# Patient Record
Sex: MALE | Race: WHITE | Employment: OTHER
[De-identification: names, ages, dates, MRNs, and addresses within clinical notes are randomized per-mention and may not be internally consistent; named-entity substitution may affect disease eponyms.]

---

## 2017-02-17 ENCOUNTER — OFFICE VISIT (OUTPATIENT)
Dept: FAMILY MEDICINE CLINIC | Facility: CLINIC | Age: 67
End: 2017-02-17

## 2017-02-17 VITALS
DIASTOLIC BLOOD PRESSURE: 76 MMHG | HEART RATE: 62 BPM | TEMPERATURE: 97.7 F | SYSTOLIC BLOOD PRESSURE: 124 MMHG | OXYGEN SATURATION: 96 %

## 2017-02-17 DIAGNOSIS — J01.90 ACUTE SINUSITIS, RECURRENCE NOT SPECIFIED, UNSPECIFIED LOCATION: Primary | ICD-10-CM

## 2017-02-17 PROCEDURE — 99212 OFFICE O/P EST SF 10 MIN: CPT | Performed by: FAMILY MEDICINE

## 2017-02-17 RX ORDER — BENZONATATE 200 MG/1
200 CAPSULE ORAL 3 TIMES DAILY PRN
Qty: 25 CAPSULE | Refills: 0 | Status: SHIPPED | OUTPATIENT
Start: 2017-02-17 | End: 2017-02-24

## 2017-02-17 RX ORDER — AMOXICILLIN 875 MG/1
875 TABLET, COATED ORAL 2 TIMES DAILY
Qty: 20 TABLET | Refills: 0 | Status: SHIPPED | OUTPATIENT
Start: 2017-02-17 | End: 2017-02-27

## 2017-02-17 RX ORDER — DILTIAZEM HYDROCHLORIDE 240 MG/1
240 CAPSULE, COATED, EXTENDED RELEASE ORAL DAILY
COMMUNITY
End: 2017-06-19 | Stop reason: SDUPTHER

## 2017-02-17 ASSESSMENT — ENCOUNTER SYMPTOMS
CHEST TIGHTNESS: 0
BLOOD IN STOOL: 0
SHORTNESS OF BREATH: 0
ABDOMINAL DISTENTION: 0
TROUBLE SWALLOWING: 0
EYE PAIN: 0

## 2017-05-12 RX ORDER — CELECOXIB 100 MG/1
CAPSULE ORAL
Qty: 30 CAPSULE | Refills: 3 | Status: SHIPPED | OUTPATIENT
Start: 2017-05-12 | End: 2018-04-27 | Stop reason: SDUPTHER

## 2017-06-19 RX ORDER — DILTIAZEM HYDROCHLORIDE 240 MG/1
240 CAPSULE, COATED, EXTENDED RELEASE ORAL DAILY
Qty: 90 CAPSULE | Refills: 0 | Status: SHIPPED | OUTPATIENT
Start: 2017-06-19 | End: 2017-09-20 | Stop reason: SDUPTHER

## 2017-06-23 ENCOUNTER — OFFICE VISIT (OUTPATIENT)
Dept: FAMILY MEDICINE CLINIC | Age: 67
End: 2017-06-23
Payer: COMMERCIAL

## 2017-06-23 VITALS
SYSTOLIC BLOOD PRESSURE: 128 MMHG | HEIGHT: 72 IN | DIASTOLIC BLOOD PRESSURE: 81 MMHG | TEMPERATURE: 97.4 F | HEART RATE: 75 BPM | OXYGEN SATURATION: 96 % | RESPIRATION RATE: 16 BRPM | BODY MASS INDEX: 28.71 KG/M2 | WEIGHT: 212 LBS

## 2017-06-23 DIAGNOSIS — J45.40 MODERATE PERSISTENT ASTHMA WITHOUT COMPLICATION: ICD-10-CM

## 2017-06-23 DIAGNOSIS — I10 ESSENTIAL HYPERTENSION: ICD-10-CM

## 2017-06-23 DIAGNOSIS — J30.89 PERENNIAL ALLERGIC RHINITIS WITH SEASONAL VARIATION: ICD-10-CM

## 2017-06-23 DIAGNOSIS — J30.2 PERENNIAL ALLERGIC RHINITIS WITH SEASONAL VARIATION: ICD-10-CM

## 2017-06-23 DIAGNOSIS — J44.9 COPD MIXED TYPE (HCC): Primary | ICD-10-CM

## 2017-06-23 PROCEDURE — 99214 OFFICE O/P EST MOD 30 MIN: CPT | Performed by: FAMILY MEDICINE

## 2017-06-23 RX ORDER — FLUTICASONE PROPIONATE 44 UG/1
AEROSOL, METERED RESPIRATORY (INHALATION)
Qty: 31.8 G | Refills: 5 | OUTPATIENT
Start: 2017-06-23

## 2017-06-23 RX ORDER — BUDESONIDE AND FORMOTEROL FUMARATE DIHYDRATE 80; 4.5 UG/1; UG/1
2 AEROSOL RESPIRATORY (INHALATION) 2 TIMES DAILY
Qty: 3 INHALER | Refills: 1 | Status: SHIPPED | OUTPATIENT
Start: 2017-06-23 | End: 2018-05-03 | Stop reason: SDUPTHER

## 2017-06-23 ASSESSMENT — ENCOUNTER SYMPTOMS
SORE THROAT: 0
SHORTNESS OF BREATH: 1
ABDOMINAL PAIN: 0
CONSTIPATION: 0
NAUSEA: 0
BLOOD IN STOOL: 0
RHINORRHEA: 0
VOMITING: 0
COUGH: 0
WHEEZING: 0
DIARRHEA: 0

## 2017-06-23 ASSESSMENT — PATIENT HEALTH QUESTIONNAIRE - PHQ9
SUM OF ALL RESPONSES TO PHQ QUESTIONS 1-9: 0
1. LITTLE INTEREST OR PLEASURE IN DOING THINGS: 0
SUM OF ALL RESPONSES TO PHQ9 QUESTIONS 1 & 2: 0
2. FEELING DOWN, DEPRESSED OR HOPELESS: 0

## 2017-07-14 RX ORDER — HYDROCHLOROTHIAZIDE 25 MG/1
25 TABLET ORAL DAILY
Qty: 45 TABLET | Refills: 1 | Status: SHIPPED | OUTPATIENT
Start: 2017-07-14 | End: 2017-10-16 | Stop reason: SDUPTHER

## 2017-09-11 ENCOUNTER — OFFICE VISIT (OUTPATIENT)
Dept: FAMILY MEDICINE CLINIC | Age: 67
End: 2017-09-11
Payer: COMMERCIAL

## 2017-09-11 VITALS
WEIGHT: 218 LBS | SYSTOLIC BLOOD PRESSURE: 131 MMHG | RESPIRATION RATE: 16 BRPM | OXYGEN SATURATION: 97 % | BODY MASS INDEX: 29.53 KG/M2 | HEART RATE: 60 BPM | TEMPERATURE: 97.5 F | HEIGHT: 72 IN | DIASTOLIC BLOOD PRESSURE: 75 MMHG

## 2017-09-11 DIAGNOSIS — M62.830 MUSCLE SPASM OF BACK: ICD-10-CM

## 2017-09-11 DIAGNOSIS — J44.9 COPD MIXED TYPE (HCC): ICD-10-CM

## 2017-09-11 DIAGNOSIS — Z23 NEED FOR INFLUENZA VACCINATION: ICD-10-CM

## 2017-09-11 DIAGNOSIS — J45.40 MODERATE PERSISTENT ASTHMA WITHOUT COMPLICATION: Primary | ICD-10-CM

## 2017-09-11 PROCEDURE — 90686 IIV4 VACC NO PRSV 0.5 ML IM: CPT | Performed by: FAMILY MEDICINE

## 2017-09-11 PROCEDURE — 90471 IMMUNIZATION ADMIN: CPT | Performed by: FAMILY MEDICINE

## 2017-09-11 PROCEDURE — 99213 OFFICE O/P EST LOW 20 MIN: CPT | Performed by: FAMILY MEDICINE

## 2017-09-11 PROCEDURE — 94010 BREATHING CAPACITY TEST: CPT | Performed by: FAMILY MEDICINE

## 2017-09-11 RX ORDER — METHOCARBAMOL 500 MG/1
500-1000 TABLET, FILM COATED ORAL EVERY 6 HOURS PRN
Qty: 120 TABLET | Refills: 1 | Status: SHIPPED | OUTPATIENT
Start: 2017-09-11 | End: 2018-09-12 | Stop reason: SDUPTHER

## 2017-09-11 RX ORDER — ALBUTEROL SULFATE 90 UG/1
2 AEROSOL, METERED RESPIRATORY (INHALATION) EVERY 6 HOURS PRN
Qty: 3 INHALER | Refills: 1 | Status: SHIPPED | OUTPATIENT
Start: 2017-09-11 | End: 2018-05-03 | Stop reason: SDUPTHER

## 2017-09-11 ASSESSMENT — ENCOUNTER SYMPTOMS
COUGH: 0
DIARRHEA: 0
BACK PAIN: 1
SHORTNESS OF BREATH: 1
ABDOMINAL PAIN: 0
BLOOD IN STOOL: 0
VOMITING: 0
CONSTIPATION: 0
NAUSEA: 0
SORE THROAT: 0
RHINORRHEA: 0

## 2017-11-14 ENCOUNTER — OFFICE VISIT (OUTPATIENT)
Dept: FAMILY MEDICINE CLINIC | Age: 67
End: 2017-11-14
Payer: MEDICARE

## 2017-11-14 VITALS
OXYGEN SATURATION: 97 % | SYSTOLIC BLOOD PRESSURE: 139 MMHG | WEIGHT: 214 LBS | HEIGHT: 72 IN | BODY MASS INDEX: 28.99 KG/M2 | DIASTOLIC BLOOD PRESSURE: 76 MMHG | HEART RATE: 63 BPM | RESPIRATION RATE: 16 BRPM | TEMPERATURE: 97.8 F

## 2017-11-14 DIAGNOSIS — Z12.5 SCREENING FOR PROSTATE CANCER: ICD-10-CM

## 2017-11-14 DIAGNOSIS — Z13.6 SCREENING FOR AAA (ABDOMINAL AORTIC ANEURYSM): ICD-10-CM

## 2017-11-14 DIAGNOSIS — Z23 NEED FOR PROPHYLACTIC VACCINATION AGAINST STREPTOCOCCUS PNEUMONIAE (PNEUMOCOCCUS): ICD-10-CM

## 2017-11-14 DIAGNOSIS — Z23 NEED FOR DIPHTHERIA-TETANUS-PERTUSSIS (TDAP) VACCINE: ICD-10-CM

## 2017-11-14 DIAGNOSIS — Z00.00 ROUTINE GENERAL MEDICAL EXAMINATION AT A HEALTH CARE FACILITY: Primary | ICD-10-CM

## 2017-11-14 PROCEDURE — 90471 IMMUNIZATION ADMIN: CPT | Performed by: FAMILY MEDICINE

## 2017-11-14 PROCEDURE — G0402 INITIAL PREVENTIVE EXAM: HCPCS | Performed by: FAMILY MEDICINE

## 2017-11-14 PROCEDURE — 90670 PCV13 VACCINE IM: CPT | Performed by: FAMILY MEDICINE

## 2017-11-14 ASSESSMENT — LIFESTYLE VARIABLES
HAVE YOU OR SOMEONE ELSE BEEN INJURED AS A RESULT OF YOUR DRINKING: 0
HOW OFTEN DURING THE LAST YEAR HAVE YOU FOUND THAT YOU WERE NOT ABLE TO STOP DRINKING ONCE YOU HAD STARTED: 1
HOW OFTEN DURING THE LAST YEAR HAVE YOU NEEDED AN ALCOHOLIC DRINK FIRST THING IN THE MORNING TO GET YOURSELF GOING AFTER A NIGHT OF HEAVY DRINKING: 0
HOW MANY STANDARD DRINKS CONTAINING ALCOHOL DO YOU HAVE ON A TYPICAL DAY: 0
HOW OFTEN DURING THE LAST YEAR HAVE YOU FAILED TO DO WHAT WAS NORMALLY EXPECTED FROM YOU BECAUSE OF DRINKING: 0
HAS A RELATIVE, FRIEND, DOCTOR, OR ANOTHER HEALTH PROFESSIONAL EXPRESSED CONCERN ABOUT YOUR DRINKING OR SUGGESTED YOU CUT DOWN: 0
HOW OFTEN DURING THE LAST YEAR HAVE YOU HAD A FEELING OF GUILT OR REMORSE AFTER DRINKING: 0
HOW OFTEN DO YOU HAVE A DRINK CONTAINING ALCOHOL: 2
HOW OFTEN DURING THE LAST YEAR HAVE YOU BEEN UNABLE TO REMEMBER WHAT HAPPENED THE NIGHT BEFORE BECAUSE YOU HAD BEEN DRINKING: 0

## 2017-11-14 ASSESSMENT — PATIENT HEALTH QUESTIONNAIRE - PHQ9: SUM OF ALL RESPONSES TO PHQ QUESTIONS 1-9: 0

## 2017-11-14 ASSESSMENT — ANXIETY QUESTIONNAIRES: GAD7 TOTAL SCORE: 0

## 2017-11-14 NOTE — PROGRESS NOTES
Medicare Annual Wellness Visit  Name: Jeff Hernandez Date: 2017   MRN: [id number] Sex: Male   Age: 74 y.o. Ethnicity: Non-/Non    : 1950 Race: [place] is here for Medicare AWV    Screenings for behavioral, psychosocial and functional/safety risks, and cognitive dysfunction are all negative except as indicated below. These results, as well as other patient data from the [address] [facility] [address] [facility], are documented in Flowsheets linked to this Encounter. Allergies   Allergen Reactions    Amlodipine Swelling     Leg Swelling    Avapro [Irbesartan]     Lipitor Other (See Comments)     insomnia    Norvasc [Amlodipine Besylate] Swelling     Prior to Visit Medications    Medication Sig Taking? Authorizing Provider   hydrochlorothiazide (HYDRODIURIL) 12.5 MG tablet Take 1 tablet by mouth daily Yes Carloz Lewis, DO   albuterol sulfate  (90 Base) MCG/ACT inhaler Inhale 2 puffs into the lungs every 6 hours as needed for Wheezing or Shortness of Breath Yes Carloz Lewis, DO   niacin (NIASPAN) 500 MG extended release tablet take 1 tablet by mouth at bedtime Yes Carloz Lewis, DO   pravastatin (PRAVACHOL) 80 MG tablet take 1 tablet by mouth once daily Yes Carloz Lewis, DO   budesonide-formoterol (SYMBICORT) 80-4.5 MCG/ACT AERO Inhale 2 puffs into the lungs 2 times daily Rinse mouth after use.  Yes Carloz Mendez, DO   celecoxib (CELEBREX) 100 MG capsule take 1 capsule by mouth once daily Yes Carloz Lewis, DO   TAZTIA  MG extended release capsule take 1 capsule by mouth once daily Yes Carloz Lainez, DO   Aspirin Buf,ZiOrb-VeXrd-FsQzs, (BUFFERED ASPIRIN) 325 MG TABS Take by mouth Yes Historical Provider, MD   Psyllium (METAMUCIL PO) Take by mouth Indications: 2 tablets twice daily  Yes Historical Provider, MD   B Complex Vitamins (VITAMIN-B COMPLEX PO) Take by mouth 2 times daily  Yes Historical Provider, MD   lisinopril (PRINIVIL;ZESTRIL) 5 MG tablet Take 5 mg by mouth daily. Yes Atif Das MD   Coenzyme Q10 (COQ10) 100 MG CAPS Take 100 mg by mouth. One daily   Yes Atif Das MD   Multiple Vitamin (MULTIVITAMIN PO) Take  by mouth. One daily    Yes Historical Provider, MD   Omega-3 Fatty Acids (FISH OIL) 1000 MG CPDR Take 3 tablets by mouth Daily. Yes Historical Provider, MD   Glucosamine-Chondroit-Vit C-Mn (GLUCOSAMINE CHONDR 1500 COMPLX PO) Take 1,500 mg by mouth.  One daily   Yes Atif Das MD     Past Medical History:   Diagnosis Date    Arthritis     Asthma     Bell's palsy     [facility]    Chicken pox     Chronic back pain     Chronic kidney disease     COPD [facility]ronic obstructive pulmonary disease) ([facility] [address]     Diverticular disease     Environmental allergies     Heart disease     has stent    Hyperlipidemia     Measles     Mumps      Past Surgical History:   Procedure Laterality Date    COLONOSCOPY  05/2015    Dr Janina Malin,  normal, recheck 10 years     CORONARY ANGIOPLASTY WITH STENT PLACEMENT  2009    FRACTURE SURGERY Left 11/2011    hand metacarple, in [facility]  [id number]    umbilical    NECK SURGERY  2000    benign lump    TONSILLECTOMY  age 11   Nayana Michael VASECTOMY       Family History   Problem Relation Age of Onset    High Blood Pressure Mother     High Cholesterol Mother     Alzheimer's Disease Mother     High Cholesterol Father     High Blood Pressure Father        CareTeam (Including outside providers/suppliers regularly involved in providing care):   Patient Care Team:  Sonny Jones DO as PCP - General (Family Medicine)  Ivory Jama MD as Surgeon (Internal Medicine Cardiovascular Disease)    Wt Readings from Last 3 Encounters:   11/14/17 214 lb (97.1 kg)   09/11/17 218 lb (98.9 kg)   06/23/17 212 lb (96.2 kg)     Vitals:    11/14/17 0840   BP: 139/76   Pulse: 63   Resp: 16   Temp: 97.8 °F (36.6 °C)   TempSrc: Oral   SpO2: 97%   Weight: 214 lb (97.1 kg) Height: 6' 0.05\" (1.83 m)       General Appearance: alert and oriented to person, place and time, well developed and well- nourished, in no acute distress  Skin: warm and dry, no rash or erythema  Head: normocephalic and atraumatic  Eyes: pupils equal, round, and reactive to light, extraocular eye movements intact, conjunctivae normal  ENT: tympanic membrane, external ear and ear canal normal bilaterally, nose without deformity, nasal mucosa and turbinates normal without polyps  Neck: supple and non-tender without mass, no thyromegaly or thyroid nodules, no cervical lymphadenopathy  Pulmonary/Chest: clear to auscultation bilaterally- no wheezes, rales or rhonchi, normal air movement, no respiratory distress  Cardiovascular: normal rate, regular rhythm, normal S1 and S2, no murmurs, rubs, clicks, or gallops, distal pulses intact, no carotid bruits  Abdomen: soft, non-tender, non-distended, normal bowel sounds, no masses or organomegaly  Pt defers genital rectal exams this date. Extremities: no cyanosis, clubbing. Note mid edema, lower legs (sock changes) with associated venous stasis change with brownish changes chronic. Musculoskeletal: normal range of motion, no joint swelling, deformity or tenderness  Neurologic: reflexes normal and symmetric, no cranial nerve deficit, gait, coordination and speech normal    The following problems were reviewed today and where indicated follow up appointments were made and/or referrals ordered. Risk Factor Screenings with Interventions:   Fall Risk:  Timed Up and Go Test > 12 seconds?: no (no problem with ambulation)  2 or more falls in past year?: no  Fall with injury in past year?: no  Fall Risk Interventions:    · None indicated    Depression:  PHQ-2 Score: 0  Depression Interventions:  · None indicated    Anxiety:  Anxiety Score: 0  Anxiety Interventions:  · None indicated    Cognitive:   Words recalled: 3 (able to recall with no problem)  Clock Drawing Test (CDT) Habits/Nutrition  Do you exercise for at least 20 minutes 2-3 times per week?: (!) No  Have you lost any weight without trying in the past 3 months?: No  Do you eat fewer than 2 meals per day?: No  Have you seen a dentist within the past year?: Yes  Body mass index is 28.99 kg/m². Health Habits/Nutrition Interventions:  · Inadequate physical activity:  patient agrees to wear a pedometer and walk at least 10,000 steps/day    Hearing/Vision  Do you or your family notice any trouble with your hearing?: (!) Yes (has hearing aids)  Do you have difficulty driving, watching TV, or doing any of your daily activities because of your eyesight?: No  Have you had an eye exam within the past year?: Yes  Hearing/Vision Interventions:  · none, as pt has hearing now.      Safety  Do you have working smoke detectors?: Yes  Have all throw rugs been removed or fastened?: Yes  Do you have non-slip mats in all bathtubs?: Yes  Do all of your stairways have a railing or banister?: Yes  Are your doorways, halls and stairs free of clutter?: Yes  Do you always fasten your seatbelt when you are in a car?: Yes  Safety Interventions:  · None indicated    ADLs  In the past 7 days, did you need help from others to perform any of the following everyday activities?: None  In the past 7 days, did you need help from others to take care of any of the following?: None  ADL Interventions:  · None indicated    Personalized Preventive Plan   Current Health Maintenance Status  Immunization History   Administered Date(s) Administered    Influenza Vaccine, unspecified formulation 01/22/2016    Influenza Virus Vaccine 10/27/2011    Influenza Whole 10/25/2010    Influenza, Intradermal, Preservative free 12/02/2014    Influenza, Quadv, 3 Years and older, IM 10/25/2016    Influenza, Quadv, 3 yrs and older, IM, Preservative Free 09/11/2017    Pneumococcal Polysaccharide ([id number]) 10/25/2007    Td 11/25/2007        Health Maintenance   Topic Date Due

## 2017-11-14 NOTE — PATIENT INSTRUCTIONS
Continue Diet low salt, high fiber, avoiding concentrated sweets, and wt control. Continue activity and exercise as tolerated. Continue present medications as ordered. Patient Education        DASH Diet: Care Instructions  Your Care Instructions  The DASH diet is an eating plan that can help lower your blood pressure. DASH stands for Dietary Approaches to Stop Hypertension. Hypertension is high blood pressure. The DASH diet focuses on eating foods that are high in calcium, potassium, and magnesium. These nutrients can lower blood pressure. The foods that are highest in these nutrients are fruits, vegetables, low-fat dairy products, nuts, seeds, and legumes. But taking calcium, potassium, and magnesium supplements instead of eating foods that are high in those nutrients does not have the same effect. The DASH diet also includes whole grains, fish, and poultry. The DASH diet is one of several lifestyle changes your doctor may recommend to lower your high blood pressure. Your doctor may also want you to decrease the amount of sodium in your diet. Lowering sodium while following the DASH diet can lower blood pressure even further than just the DASH diet alone. Follow-up care is a key part of your treatment and safety. Be sure to make and go to all appointments, and call your doctor if you are having problems. It's also a good idea to know your test results and keep a list of the medicines you take. How can you care for yourself at home? Following the DASH diet  · Eat 4 to 5 servings of fruit each day. A serving is 1 medium-sized piece of fruit, ½ cup chopped or canned fruit, 1/4 cup dried fruit, or 4 ounces (½ cup) of fruit juice. Choose fruit more often than fruit juice. · Eat 4 to 5 servings of vegetables each day. A serving is 1 cup of lettuce or raw leafy vegetables, ½ cup of chopped or cooked vegetables, or 4 ounces (½ cup) of vegetable juice.  Choose vegetables more often than vegetable appetizer instead of chips and dip. ¨ Sprinkle sunflower seeds or chopped almonds over salads. Or try adding chopped walnuts or almonds to cooked vegetables. ¨ Try some vegetarian meals using beans and peas. Add garbanzo or kidney beans to salads. Make burritos and tacos with mashed whitaker beans or black beans. Where can you learn more? Go to https://StartSamplingraginieweb.[employer]. org and sign in to your Invisible account. Enter Y932 in the SeeChange Health box to learn more about \"DASH Diet: Care Instructions. \"     If you do not have an account, please click on the \"Sign Up Now\" link. Current as of: April 3, 2017  Content Version: 11.3  © 2161-2715 [employer]. Care instructions adapted under license by [facility] ([facility]). If you have questions about a medical condition or this instruction, always ask your healthcare professional. [place] [address] any warranty or liability for your use of this information. Patient Education        Walking for Exercise: Care Instructions  Your Care Instructions    Walking is one of the easiest ways to get the exercise you need for good health. A brisk, 30-minute walk each day can help you feel better and have more energy. It can help you lower your risk of disease. Walking can help you keep your bones strong and your heart healthy. Check with your doctor before you start a walking plan if you have heart problems, other health issues, or you have not been active in a long time. Follow your doctor's instructions for safe levels of exercise. Follow-up care is a key part of your treatment and safety. Be sure to make and go to all appointments, and call your doctor if you are having problems. It's also a good idea to know your test results and keep a list of the medicines you take. How can you care for yourself at home? Getting started  · Start slowly and set a short-term goal. For example, walk for 5 or 10 minutes every day.   · Bit by bit, increase the amount you walk every day. Try for at least 30 minutes on most days of the week. You also may want to swim, bike, or do other activities. · If finding enough time is a problem, it is fine to be active in blocks of 10 minutes or more throughout your day and week. · To get the heart-healthy benefits of walking, you need to walk briskly enough to increase your heart rate and breathing, but not so fast that you cannot talk comfortably. · Wear comfortable shoes that fit well and provide good support for your feet and ankles. Staying with your plan  · After you've made walking a habit, set a longer-term goal. You may want to set a goal of walking briskly for longer or walking farther. Experts say to do 2½ hours of moderate activity a week. A faster heartbeat is what defines moderate-level activity. · To stay motivated, walk with friends, coworkers, or pets. · Use a phone loida or pedometer to track your steps each day. Set a goal to increase your steps. Once you get there, set a higher goal. Aim for 10,000 steps a day. · If the weather keeps you from walking outside, go for walks at the mall with a friend. Local schools and churches may have indoor gyms where you can walk. Fitting a walk into your workday  · Park several blocks away from work, or get off the bus a few stops early. · Use the stairs instead of the elevator, at least for a few floors. · Suggest holding meetings with colleagues during a walk inside or outside the building. · Use the restroom that is the farthest from your desk or workstation. · Use your morning and afternoon breaks to take quick 15-minute walks. Staying safe  · Know your surroundings. Walk in a well-lighted, safe place. If it is dark, walk with a partner. Wear light-colored clothing. If you can, buy a vest or jacket that reflects light. · Carry a cell phone for emergencies. · Drink plenty of water. Take a water bottle with you when you walk.  This is very important if won't have to make tough decisions by themselves. Follow-up care is a key part of your treatment and safety. Be sure to make and go to all appointments, and call your doctor if you are having problems. It's also a good idea to know your test results and keep a list of the medicines you take. How can you care for yourself at home? · Discuss your wishes with your loved ones and your doctor. This way, there are no surprises. · Many states have a unique form. Or you might use a universal form that has been approved by many states. This kind of form can sometimes be completed and stored online. Your electronic copy will then be available wherever you have a connection to the Internet. In most cases, doctors will respect your wishes even if you have a form from a different state. · You don't need a  to do an advance directive. But you may want to get legal advice. · Think about these questions when you prepare an advance directive:  ¨ Who do you want to make decisions about your medical care if you are not able to? Many people choose a family member or close friend. ¨ Do you know enough about life support methods that might be used? If not, talk to your doctor so you understand. ¨ What are you most afraid of that might happen? You might be afraid of having pain, losing your independence, or being kept alive by machines. ¨ Where would you prefer to die? Choices include your home, a hospital, or a nursing home. ¨ Would you like to have information about hospice care to support you and your family? ¨ Do you want to donate organs when you die? ¨ Do you want certain Denominational practices performed before you die? If so, put your wishes in the advance directive. · Read your advance directive every year, and make changes as needed. When should you call for help? Be sure to contact your doctor if you have any questions. Where can you learn more? Go to https://sin.[employer]. org and sign in to one or two times a year for checkups and to have your teeth cleaned. Wear a seat belt in the car. Limit alcohol to 2 drinks a day for men and 1 drink a day for women. Too much alcohol can cause health problems. Follow your doctor's advice about when to have certain tests. These tests can spot problems early. For men and women  Cholesterol. Your doctor will tell you how often to have this done based on your overall health and other things that can increase your risk for heart attack and stroke. Blood pressure. Have your blood pressure checked during a routine doctor visit. Your doctor will tell you how often to check your blood pressure based on your age, your blood pressure results, and other factors. Diabetes. Ask your doctor whether you should have tests for diabetes. Vision. Experts recommend that you have yearly exams for glaucoma and other age-related eye problems. Hearing. Tell your doctor if you notice any change in your hearing. You can have tests to find out how well you hear. Colon cancer tests. Keep having colon cancer tests as your doctor recommends. You can have one of several types of tests. Heart attack and stroke risk. At least every 4 to 6 years, you should have your risk for heart attack and stroke assessed. Your doctor uses factors such as your age, blood pressure, cholesterol, and whether you smoke or have diabetes to show what your risk for a heart attack or stroke is over the next 10 years. Osteoporosis. Talk to your doctor about whether you should have a bone density test to find out whether you have thinning bones. Also ask your doctor about whether you should take calcium and vitamin D supplements. For women  Pap test and pelvic exam. You may no longer need a Pap test. Talk with your doctor about whether to stop or continue to have Pap tests. Breast exam and mammogram. Ask how often you should have a mammogram, which is an X-ray of your breasts.  A mammogram can spot breast

## 2017-11-27 ENCOUNTER — OFFICE VISIT (OUTPATIENT)
Dept: FAMILY MEDICINE CLINIC | Age: 67
End: 2017-11-27
Payer: MEDICARE

## 2017-11-27 VITALS
WEIGHT: 217 LBS | RESPIRATION RATE: 16 BRPM | TEMPERATURE: 97.2 F | DIASTOLIC BLOOD PRESSURE: 82 MMHG | SYSTOLIC BLOOD PRESSURE: 138 MMHG | HEIGHT: 72 IN | OXYGEN SATURATION: 97 % | HEART RATE: 72 BPM | BODY MASS INDEX: 29.39 KG/M2

## 2017-11-27 DIAGNOSIS — I10 ESSENTIAL HYPERTENSION: Primary | ICD-10-CM

## 2017-11-27 DIAGNOSIS — E78.2 HYPERLIPIDEMIA, MIXED: ICD-10-CM

## 2017-11-27 DIAGNOSIS — R73.09 ELEVATED GLUCOSE: ICD-10-CM

## 2017-11-27 DIAGNOSIS — N18.2 STAGE 2 CHRONIC KIDNEY DISEASE: ICD-10-CM

## 2017-11-27 PROCEDURE — 99214 OFFICE O/P EST MOD 30 MIN: CPT | Performed by: FAMILY MEDICINE

## 2017-11-27 PROCEDURE — G8427 DOCREV CUR MEDS BY ELIG CLIN: HCPCS | Performed by: FAMILY MEDICINE

## 2017-11-27 PROCEDURE — G8484 FLU IMMUNIZE NO ADMIN: HCPCS | Performed by: FAMILY MEDICINE

## 2017-11-27 PROCEDURE — 1036F TOBACCO NON-USER: CPT | Performed by: FAMILY MEDICINE

## 2017-11-27 PROCEDURE — 4040F PNEUMOC VAC/ADMIN/RCVD: CPT | Performed by: FAMILY MEDICINE

## 2017-11-27 PROCEDURE — 1123F ACP DISCUSS/DSCN MKR DOCD: CPT | Performed by: FAMILY MEDICINE

## 2017-11-27 PROCEDURE — G8417 CALC BMI ABV UP PARAM F/U: HCPCS | Performed by: FAMILY MEDICINE

## 2017-11-27 PROCEDURE — 3017F COLORECTAL CA SCREEN DOC REV: CPT | Performed by: FAMILY MEDICINE

## 2017-11-27 RX ORDER — HYDROCHLOROTHIAZIDE 12.5 MG/1
12.5 TABLET ORAL 2 TIMES DAILY
Qty: 180 TABLET | Refills: 1 | Status: SHIPPED | OUTPATIENT
Start: 2017-11-27 | End: 2017-12-05 | Stop reason: DRUGHIGH

## 2017-11-27 ASSESSMENT — ENCOUNTER SYMPTOMS
BLOOD IN STOOL: 0
VOICE CHANGE: 0
CONSTIPATION: 0
BACK PAIN: 0
SINUS PRESSURE: 0
VOMITING: 0
NAUSEA: 0
RHINORRHEA: 0
SORE THROAT: 0
ABDOMINAL PAIN: 0
DIARRHEA: 0
COLOR CHANGE: 0
SHORTNESS OF BREATH: 0
WHEEZING: 0
EYE ITCHING: 0
ROS SKIN COMMENTS: NO NEW CONCERNING LESIONS
EYE REDNESS: 0
COUGH: 0

## 2017-11-27 NOTE — PROGRESS NOTES
Chronic Disease Visit Information    BP Readings from Last 3 Encounters:   11/14/17 139/76   09/11/17 131/75   06/23/17 128/81          LDL Cholesterol (mg/dL)   Date Value   11/16/2016 82     HDL (mg/dL)   Date Value   11/16/2016 71     BUN (mg/dL)   Date Value   11/16/2016 16     CREATININE (mg/dL)   Date Value   11/16/2016 0.81     Glucose (mg/dL)   Date Value   11/16/2016 105 (H)   02/18/2012 99            Have you changed or started any medications since your last visit including any over-the-counter medicines, vitamins, or herbal medicines? no   Are you having any side effects from any of your medications? -  no  Have you stopped taking any of your medications? Is so, why? -  no    Have you seen any other physician or provider since your last visit? No  Have you had any other diagnostic tests since your last visit? No  Have you been seen in the emergency room and/or had an admission to a hospital since we last saw you? No  Have you had your annual diabetic retinal (eye) exam? No  Have you had your routine dental cleaning in the past 6 months? yes     Have you activated your DocASAP account? If not, what are your barriers?  Yes     Patient Care Team:  Britt Magaña DO as PCP - General (Family Medicine)  Alena Kulkarni MD as Surgeon (Internal Medicine Cardiovascular Disease)         Medical History Review  Past Medical, Family, and Social History reviewed and does not contribute to the patient presenting condition    Health Maintenance   Topic Date Due    AAA screen  1950    DTaP/Tdap/Td vaccine (1 - Tdap) 11/26/2007    Pneumococcal low/med risk (2 of 2 - PPSV23) 11/14/2018    Diabetes screen  11/16/2019    Lipid screen  11/16/2021    Colon cancer screen colonoscopy  05/04/2025    Zostavax vaccine  Addressed    Flu vaccine  Completed    Hepatitis C screen  Completed    HIV screen  Completed

## 2017-11-27 NOTE — PATIENT INSTRUCTIONS
Continue Diet low salt, high fiber, avoiding concentrated sweets. Continue activity and exercise as tolerated. Continue present medications as ordered. Note to increase the HCTZ to 12.5 twice daily. To get the fasting labs in about one week as ordered, Call on test results after done if you do not hear from us in one week. To get the previous labs as ordered with above. Patient Education        DASH Diet: Care Instructions  Your Care Instructions  The DASH diet is an eating plan that can help lower your blood pressure. DASH stands for Dietary Approaches to Stop Hypertension. Hypertension is high blood pressure. The DASH diet focuses on eating foods that are high in calcium, potassium, and magnesium. These nutrients can lower blood pressure. The foods that are highest in these nutrients are fruits, vegetables, low-fat dairy products, nuts, seeds, and legumes. But taking calcium, potassium, and magnesium supplements instead of eating foods that are high in those nutrients does not have the same effect. The DASH diet also includes whole grains, fish, and poultry. The DASH diet is one of several lifestyle changes your doctor may recommend to lower your high blood pressure. Your doctor may also want you to decrease the amount of sodium in your diet. Lowering sodium while following the DASH diet can lower blood pressure even further than just the DASH diet alone. Follow-up care is a key part of your treatment and safety. Be sure to make and go to all appointments, and call your doctor if you are having problems. It's also a good idea to know your test results and keep a list of the medicines you take. How can you care for yourself at home? Following the DASH diet  · Eat 4 to 5 servings of fruit each day. A serving is 1 medium-sized piece of fruit, ½ cup chopped or canned fruit, 1/4 cup dried fruit, or 4 ounces (½ cup) of fruit juice. Choose fruit more often than fruit juice.   · Eat 4 to 5 servings of low-fat mozzarella cheese and lots of vegetable toppings. Try using tomatoes, squash, spinach, broccoli, carrots, cauliflower, and onions. ¨ Have a variety of cut-up vegetables with a low-fat dip as an appetizer instead of chips and dip. ¨ Sprinkle sunflower seeds or chopped almonds over salads. Or try adding chopped walnuts or almonds to cooked vegetables. ¨ Try some vegetarian meals using beans and peas. Add garbanzo or kidney beans to salads. Make burritos and tacos with mashed whitaker beans or black beans. Where can you learn more? Go to https://Sammie J[employer] Divine Cupcakes & BakerypeHezmedia Interactiveeb.Relatient. org and sign in to your [employer] account. Enter U931 in the [employer] box to learn more about \"DASH Diet: Care Instructions. \"     If you do not have an account, please click on the \"Sign Up Now\" link. Current as of: April 3, 2017  Content Version: 11.3  © 5428-1448 [employer]. Care instructions adapted under license by [facility] ([facility]). If you have questions about a medical condition or this instruction, always ask your healthcare professional. [address] any warranty or liability for your use of this information. Patient Education        Learning About Chronic Kidney Disease and Protein  What is protein? Protein is an important nutrient made up of chemicals called amino acids. Protein provides energy. Your body also needs protein to make new cells, maintain and rebuild muscles, carry other nutrients, act as messengers in the body, and support the immune system. How does protein affect chronic kidney disease? When protein breaks down in your body, it forms waste products. If you have kidney disease, the kidneys have trouble getting rid of waste products, so waste can build up in your blood. Eating more protein than your body can handle can be bad for your kidneys and make you very sick.  But if you don't get enough protein, you can become weak and tired and are more likely to get infections. If you have chronic kidney disease, you can help protect your kidneys by making changes to your diet so that you get the right amount of protein. How can you manage your diet? Ask your doctor or dietitian how much protein you can have each day, and learn which foods contain protein. Your doctor or dietitian can help you plan a diet that gives you the right amount of protein. There is no single diet that is right for everyone who has chronic kidney disease. Your diet will be based on how well your kidneys are working and whether you are on dialysis. When you have kidney disease, your diet may change over time as your disease changes. See your doctor for regular testing so you know when your diet may need to change. Your doctor or dietitian can help you adjust your diet as needed. Changing your diet can be hard. You may have to give up many foods you like. But it is very important to make the recommended changes so you can stay healthy for as long as possible. Which foods contain protein? High-protein foods include:  · Meat. · Poultry. · Seafood. · Eggs. Other foods that contain protein include:  · Milk and milk products. · Beans and nuts. · Breads, pastas, and cereals. · Vegetables. Where can you learn more? Go to https://Iron.io.Biopharmacopae. org and sign in to your [employer] account. Enter D386 in the [facility] box to learn more about \"Learning About Chronic Kidney Disease and Protein. \"     If you do not have an account, please click on the \"Sign Up Now\" link. Current as of: April 3, 2017  Content Version: 11.3  © 9674-9906 [employer]. Care instructions adapted under license by [facility] ([facility]). If you have questions about a medical condition or this instruction, always ask your healthcare professional. [place] [address] any warranty or liability for your use of this information.

## 2017-11-27 NOTE — PROGRESS NOTES
Subjective:   11/27/2017    Ottoniel Gutierrez is a 74 y.o. male  Today presents with:  Chief Complaint   Patient presents with    Hypertension     6 month ck up       HPI   Pt presents for follow up of HTN, chol, elevated glucose,CKD, and some mild swelling. Overall is feeling fairly well. Notes still some mild swelling, and rings with sock marks. Note the previous venous insuffiencey. To see cardiology next spring and to have EKG then. But has been doing fairly well. He is trying to follow diet and exercise regularly. Pt taking medications as prescribed? Yes  Tolerated well? Yes. But is due for additional labs.      Lab Results   Component Value Date     11/16/2016    K 4.6 11/16/2016     11/16/2016    CO2 28 11/16/2016    BUN 16 11/16/2016    CREATININE 0.81 11/16/2016    GLUCOSE 105 (H) 11/16/2016    CALCIUM 9.3 11/16/2016    PROT 6.8 11/16/2016    LABALBU 4.6 11/16/2016    BILITOT 0.46 11/16/2016    ALKPHOS 45 11/16/2016    AST 20 11/16/2016    ALT 23 11/16/2016    LABGLOM >60 11/16/2016    GFRAA >60 11/16/2016     Lab Results   Component Value Date    CHOL 182 11/16/2016     Lab Results   Component Value Date    TRIG 144 11/16/2016     Lab Results   Component Value Date    HDL 71 11/16/2016     Lab Results   Component Value Date    LDLCHOLESTEROL 82 11/16/2016     Lab Results   Component Value Date    VLDL NOT REPORTED 11/16/2016     Lab Results   Component Value Date    CHOLHDLRATIO 2.6 11/16/2016     Lab Results   Component Value Date    TSH 3.97 02/10/2016       Current Outpatient Prescriptions   Medication Sig Dispense Refill    hydrochlorothiazide (HYDRODIURIL) 12.5 MG tablet Take 1 tablet by mouth 2 times daily 180 tablet 1    albuterol sulfate  (90 Base) MCG/ACT inhaler Inhale 2 puffs into the lungs every 6 hours as needed for Wheezing or Shortness of Breath 3 Inhaler 1    niacin (NIASPAN) 500 MG extended release tablet take 1 tablet by mouth at bedtime 90 tablet 1 intolerance, heat intolerance, polydipsia, polyphagia and polyuria. Genitourinary: Negative for discharge, dysuria, frequency, genital sores, hematuria, testicular pain and urgency. Musculoskeletal: Negative for back pain (at times, but the muscle relaxants help), joint swelling, myalgias and neck pain. Arthralgias: usual, not bad. Skin: Negative for color change and rash. No new concerning lesions   Allergic/Immunologic: Negative for food allergies and immunocompromised state. Neurological: Negative for dizziness, tremors, seizures, syncope, speech difficulty, weakness, numbness and headaches. Note the Monet's doing better. Hematological: Negative for adenopathy. Does not bruise/bleed easily. Psychiatric/Behavioral: Negative. Negative for dysphoric mood, self-injury, sleep disturbance and suicidal ideas. The patient is not nervous/anxious. No depression       Objective:     Physical Exam   Constitutional: He is oriented to person, place, and time. He appears well-developed and well-nourished. No distress. /82   Pulse 72   Temp 97.2 °F (36.2 °C) (Oral)   Resp 16   Ht 6' 0.05\" (1.83 m)   Wt 217 lb (98.4 kg)   SpO2 97%   BMI 29.39 kg/m²      HENT:   Head: Normocephalic. Eyes: No scleral icterus. Neck: No JVD present. No thyromegaly present. Cardiovascular: Normal rate, regular rhythm, normal heart sounds and intact distal pulses. No murmur heard. Pulmonary/Chest: Effort normal and breath sounds normal. No respiratory distress. He has no wheezes. He has no rales. Presently grossly clear. Abdominal: Soft. Bowel sounds are normal. He exhibits no distension and no mass. There is no tenderness. Musculoskeletal: Edema: slight of socks only,  neg Homans'   Lymphadenopathy:     He has no cervical adenopathy. Neurological: He is alert and oriented to person, place, and time. Note still minimal Bell's palsy weakness of face, but much improved.       Skin: No chemicals called amino acids. Protein provides energy. Your body also needs protein to make new cells, maintain and rebuild muscles, carry other nutrients, act as messengers in the body, and support the immune system. How does protein affect chronic kidney disease? When protein breaks down in your body, it forms waste products. If you have kidney disease, the kidneys have trouble getting rid of waste products, so waste can build up in your blood. Eating more protein than your body can handle can be bad for your kidneys and make you very sick. But if you don't get enough protein, you can become weak and tired and are more likely to get infections. If you have chronic kidney disease, you can help protect your kidneys by making changes to your diet so that you get the right amount of protein. How can you manage your diet? Ask your doctor or dietitian how much protein you can have each day, and learn which foods contain protein. Your doctor or dietitian can help you plan a diet that gives you the right amount of protein. There is no single diet that is right for everyone who has chronic kidney disease. Your diet will be based on how well your kidneys are working and whether you are on dialysis. When you have kidney disease, your diet may change over time as your disease changes. See your doctor for regular testing so you know when your diet may need to change. Your doctor or dietitian can help you adjust your diet as needed. Changing your diet can be hard. You may have to give up many foods you like. But it is very important to make the recommended changes so you can stay healthy for as long as possible. Which foods contain protein? High-protein foods include:  · Meat. · Poultry. · Seafood. · Eggs. Other foods that contain protein include:  · Milk and milk products. · Beans and nuts. · Breads, pastas, and cereals. · Vegetables. Where can you learn more? Go to https://sin.healthNeoGuide Systemspartners. org and sign in to your [employer] account. Enter D386 in the [employer] box to learn more about \"Learning About Chronic Kidney Disease and Protein. \"     If you do not have an account, please click on the \"Sign Up Now\" link. Current as of: April 3, 2017  Content Version: 11.3  © 6657-4866 [email or web address]. Care instructions adapted under license by [facility] ([facility]). If you have questions about a medical condition or this instruction, always ask your healthcare professional. [place] [address] any warranty or liability for your use of this information. Orders Placed This Encounter   Procedures    CBC Auto Differential     Standing Status:   Future     Standing Expiration Date:   11/27/2018    Comprehensive Metabolic Panel     Standing Status:   Future     Standing Expiration Date:   11/27/2018    Lipid Panel     Standing Status:   Future     Standing Expiration Date:   11/27/2018     Order Specific Question:   Is Patient Fasting?/# of Hours     Answer:   Yes - 12 hours    Hemoglobin A1C     Standing Status:   Future     Standing Expiration Date:   11/27/2018        Orders Placed This Encounter   Medications    hydrochlorothiazide (HYDRODIURIL) 12.5 MG tablet     Sig: Take 1 tablet by mouth 2 times daily     Dispense:  180 tablet     Refill:  1       Leonor Goins received counseling on the following healthy behaviors: nutrition, exercise and medication adherence    Patient given educational materials on Nutrition, Hypertension and CKD. I have instructed Leonor Goins to complete a self tracking handout on Blood Pressures  and instructed them to bring it with them to his next appointment. Discussed use, benefit, and side effects of prescribed medications. Barriers to medication compliance addressed. All patient questions answered. Pt voiced understanding.

## 2017-12-05 DIAGNOSIS — E78.2 HYPERLIPIDEMIA, MIXED: Primary | ICD-10-CM

## 2017-12-05 DIAGNOSIS — I10 ESSENTIAL HYPERTENSION: ICD-10-CM

## 2017-12-05 RX ORDER — LANOLIN ALCOHOL/MO/W.PET/CERES
500 CREAM (GRAM) TOPICAL DAILY
Qty: 90 TABLET | Refills: 1 | Status: SHIPPED | OUTPATIENT
Start: 2017-12-05 | End: 2018-05-03 | Stop reason: SDUPTHER

## 2017-12-05 RX ORDER — HYDROCHLOROTHIAZIDE 25 MG/1
25 TABLET ORAL DAILY
Qty: 90 TABLET | Refills: 1 | Status: SHIPPED | OUTPATIENT
Start: 2017-12-05 | End: 2018-05-03 | Stop reason: SDUPTHER

## 2017-12-21 ENCOUNTER — HOSPITAL ENCOUNTER (OUTPATIENT)
Age: 67
Discharge: HOME | End: 2017-12-21
Payer: MEDICARE

## 2017-12-21 ENCOUNTER — HOSPITAL ENCOUNTER (OUTPATIENT)
Dept: VASCULAR LAB | Age: 67
Discharge: HOME | End: 2017-12-21
Payer: MEDICARE

## 2017-12-21 DIAGNOSIS — Z12.5 SCREENING FOR PROSTATE CANCER: ICD-10-CM

## 2017-12-21 DIAGNOSIS — N18.2 STAGE 2 CHRONIC KIDNEY DISEASE: ICD-10-CM

## 2017-12-21 DIAGNOSIS — I10 ESSENTIAL HYPERTENSION: ICD-10-CM

## 2017-12-21 DIAGNOSIS — R73.09 ELEVATED GLUCOSE: ICD-10-CM

## 2017-12-21 DIAGNOSIS — Z13.6 SCREENING FOR AAA (ABDOMINAL AORTIC ANEURYSM): Primary | ICD-10-CM

## 2017-12-21 DIAGNOSIS — E78.2 HYPERLIPIDEMIA, MIXED: ICD-10-CM

## 2017-12-21 LAB
ABSOLUTE EOS #: 0.2 K/UL (ref 0–0.4)
ABSOLUTE IMMATURE GRANULOCYTE: ABNORMAL K/UL (ref 0–0.3)
ABSOLUTE LYMPH #: 1.7 K/UL (ref 1–4.8)
ABSOLUTE MONO #: 1 K/UL (ref 0.2–0.8)
ALBUMIN SERPL-MCNC: 4.5 G/DL (ref 3.5–5.2)
ALBUMIN/GLOBULIN RATIO: ABNORMAL (ref 1–2.5)
ALP BLD-CCNC: 45 U/L (ref 40–129)
ALT SERPL-CCNC: 28 U/L (ref 5–41)
ANION GAP SERPL CALCULATED.3IONS-SCNC: 12 MMOL/L (ref 9–17)
AST SERPL-CCNC: 24 U/L
BASOPHILS # BLD: 1 % (ref 0–2)
BASOPHILS ABSOLUTE: 0.1 K/UL (ref 0–0.2)
BILIRUB SERPL-MCNC: 0.85 MG/DL (ref 0.3–1.2)
BUN BLDV-MCNC: 18 MG/DL (ref 8–23)
BUN/CREAT BLD: 16 (ref 9–20)
CALCIUM SERPL-MCNC: 9.4 MG/DL (ref 8.6–10.4)
CHLORIDE BLD-SCNC: 98 MMOL/L (ref 98–107)
CHOLESTEROL/HDL RATIO: 3.2
CHOLESTEROL: 221 MG/DL
CO2: 30 MMOL/L (ref 20–31)
CREAT SERPL-MCNC: 1.15 MG/DL (ref 0.7–1.2)
DIFFERENTIAL TYPE: ABNORMAL
EOSINOPHILS RELATIVE PERCENT: 4 % (ref 1–4)
GFR AFRICAN AMERICAN: >60 ML/MIN
GFR NON-AFRICAN AMERICAN: >60 ML/MIN
GFR SERPL CREATININE-BSD FRML MDRD: ABNORMAL ML/MIN/{1.73_M2}
GFR SERPL CREATININE-BSD FRML MDRD: ABNORMAL ML/MIN/{1.73_M2}
GLUCOSE BLD-MCNC: 115 MG/DL (ref 70–99)
HCT VFR BLD CALC: 50.9 % (ref 41–53)
HDLC SERPL-MCNC: 70 MG/DL
HEMOGLOBIN: 16.9 G/DL (ref 13.5–17.5)
IMMATURE GRANULOCYTES: ABNORMAL %
LDL CHOLESTEROL: 124 MG/DL (ref 0–130)
LYMPHOCYTES # BLD: 30 % (ref 24–44)
MCH RBC QN AUTO: 31.6 PG (ref 26–34)
MCHC RBC AUTO-ENTMCNC: 33.1 G/DL (ref 31–37)
MCV RBC AUTO: 95.3 FL (ref 80–100)
MONOCYTES # BLD: 17 % (ref 1–7)
PDW BLD-RTO: 13.8 % (ref 11.5–14.5)
PLATELET # BLD: 283 K/UL (ref 130–400)
PLATELET ESTIMATE: ABNORMAL
PMV BLD AUTO: ABNORMAL FL (ref 6–12)
POTASSIUM SERPL-SCNC: 3.9 MMOL/L (ref 3.7–5.3)
PROSTATE SPECIFIC ANTIGEN: 1.23 UG/L
RBC # BLD: 5.34 M/UL (ref 4.5–5.9)
RBC # BLD: ABNORMAL 10*6/UL
SEG NEUTROPHILS: 48 % (ref 36–66)
SEGMENTED NEUTROPHILS ABSOLUTE COUNT: 2.7 K/UL (ref 1.8–7.7)
SODIUM BLD-SCNC: 140 MMOL/L (ref 135–144)
TOTAL PROTEIN: 7.3 G/DL (ref 6.4–8.3)
TRIGL SERPL-MCNC: 134 MG/DL
VLDLC SERPL CALC-MCNC: ABNORMAL MG/DL (ref 1–30)
WBC # BLD: 5.7 K/UL (ref 3.5–11)
WBC # BLD: ABNORMAL 10*3/UL

## 2017-12-21 PROCEDURE — 76706 US ABDL AORTA SCREEN AAA: CPT

## 2017-12-21 PROCEDURE — 83036 HEMOGLOBIN GLYCOSYLATED A1C: CPT

## 2017-12-21 PROCEDURE — G0103 PSA SCREENING: HCPCS

## 2017-12-21 PROCEDURE — 80053 COMPREHEN METABOLIC PANEL: CPT

## 2017-12-21 PROCEDURE — 85025 COMPLETE CBC W/AUTO DIFF WBC: CPT

## 2017-12-21 PROCEDURE — 36415 COLL VENOUS BLD VENIPUNCTURE: CPT

## 2017-12-21 PROCEDURE — 80061 LIPID PANEL: CPT

## 2017-12-22 LAB
ESTIMATED AVERAGE GLUCOSE: 111 MG/DL
HBA1C MFR BLD: 5.5 % (ref 4–6)

## 2018-01-29 DIAGNOSIS — E78.2 HYPERLIPIDEMIA, MIXED: ICD-10-CM

## 2018-01-29 RX ORDER — DILTIAZEM HYDROCHLORIDE 240 MG/1
240 CAPSULE, EXTENDED RELEASE ORAL DAILY
Qty: 90 CAPSULE | Refills: 1 | Status: SHIPPED | OUTPATIENT
Start: 2018-01-29 | End: 2018-05-03 | Stop reason: SDUPTHER

## 2018-01-29 RX ORDER — PRAVASTATIN SODIUM 80 MG/1
80 TABLET ORAL DAILY
Qty: 90 TABLET | Refills: 1 | Status: SHIPPED | OUTPATIENT
Start: 2018-01-29 | End: 2018-05-03 | Stop reason: SDUPTHER

## 2018-04-27 ENCOUNTER — PATIENT MESSAGE (OUTPATIENT)
Dept: FAMILY MEDICINE CLINIC | Age: 68
End: 2018-04-27

## 2018-04-27 RX ORDER — CELECOXIB 100 MG/1
100 CAPSULE ORAL DAILY PRN
Qty: 30 CAPSULE | Refills: 3 | Status: SHIPPED | OUTPATIENT
Start: 2018-04-27 | End: 2018-05-03 | Stop reason: SDUPTHER

## 2018-04-27 NOTE — TELEPHONE ENCOUNTER
From: Sarmad Carlson  To: Fabby Samaniego DO  Sent: 4/27/2018 4:01 PM EDT  Subject: Visit Follow-Up Question    RE: Billing It appears I have an outstanding balance due of $104.53 for a Comp visit on 11/27/17 that was not covered by my Medicare or secondary insurance. This visit was required by the Dr. Khang Carlos due to a computer system malfunction on the day of my Medicare Welcome on 11/14/17. The Dr was not able to address my medical issues outside the Welcome exam format on 11/14, and requested that I return for an additional visit on 11/27 to address them. The issues involved changes in my medication. I do not believe I should be responsible to pay the outstanding balance that is due to a computer glitch in the Doctors office that has since been rectified. Please advise how I should move forward with this issue.  Thank you Sarmad Carlson

## 2018-05-03 DIAGNOSIS — J45.40 MODERATE PERSISTENT ASTHMA WITHOUT COMPLICATION: ICD-10-CM

## 2018-05-03 DIAGNOSIS — I10 ESSENTIAL HYPERTENSION: ICD-10-CM

## 2018-05-03 DIAGNOSIS — J44.9 COPD MIXED TYPE (HCC): ICD-10-CM

## 2018-05-03 DIAGNOSIS — E78.2 HYPERLIPIDEMIA, MIXED: ICD-10-CM

## 2018-05-03 DIAGNOSIS — M25.50 ARTHRALGIA OF MULTIPLE JOINTS: Primary | ICD-10-CM

## 2018-05-03 RX ORDER — LISINOPRIL 5 MG/1
5 TABLET ORAL DAILY
Qty: 90 TABLET | Refills: 1 | Status: SHIPPED | OUTPATIENT
Start: 2018-05-03 | End: 2018-09-12 | Stop reason: SDUPTHER

## 2018-05-03 RX ORDER — ALBUTEROL SULFATE 90 UG/1
2 AEROSOL, METERED RESPIRATORY (INHALATION) EVERY 6 HOURS PRN
Qty: 3 INHALER | Refills: 1 | Status: SHIPPED | OUTPATIENT
Start: 2018-05-03 | End: 2019-11-25 | Stop reason: SDUPTHER

## 2018-05-03 RX ORDER — LANOLIN ALCOHOL/MO/W.PET/CERES
500 CREAM (GRAM) TOPICAL DAILY
Qty: 90 TABLET | Refills: 1 | Status: SHIPPED | OUTPATIENT
Start: 2018-05-03 | End: 2018-10-05

## 2018-05-03 RX ORDER — BUDESONIDE AND FORMOTEROL FUMARATE DIHYDRATE 80; 4.5 UG/1; UG/1
2 AEROSOL RESPIRATORY (INHALATION) 2 TIMES DAILY
Qty: 3 INHALER | Refills: 1 | Status: SHIPPED | OUTPATIENT
Start: 2018-05-03 | End: 2018-09-12 | Stop reason: SDUPTHER

## 2018-05-03 RX ORDER — HYDROCHLOROTHIAZIDE 25 MG/1
25 TABLET ORAL DAILY
Qty: 90 TABLET | Refills: 1 | Status: SHIPPED | OUTPATIENT
Start: 2018-05-03 | End: 2018-09-12 | Stop reason: SDUPTHER

## 2018-05-03 RX ORDER — PRAVASTATIN SODIUM 80 MG/1
TABLET ORAL
Qty: 90 TABLET | Refills: 1 | Status: SHIPPED | OUTPATIENT
Start: 2018-05-03 | End: 2018-09-12 | Stop reason: SDUPTHER

## 2018-05-03 RX ORDER — DILTIAZEM HYDROCHLORIDE 240 MG/1
240 CAPSULE, EXTENDED RELEASE ORAL DAILY
Qty: 90 CAPSULE | Refills: 1 | Status: SHIPPED | OUTPATIENT
Start: 2018-05-03 | End: 2018-09-12 | Stop reason: SDUPTHER

## 2018-05-03 RX ORDER — CELECOXIB 100 MG/1
100 CAPSULE ORAL DAILY PRN
Qty: 90 CAPSULE | Refills: 1 | Status: SHIPPED | OUTPATIENT
Start: 2018-05-03 | End: 2018-09-12 | Stop reason: SDUPTHER

## 2018-09-12 ENCOUNTER — OFFICE VISIT (OUTPATIENT)
Dept: FAMILY MEDICINE CLINIC | Age: 68
End: 2018-09-12
Payer: MEDICARE

## 2018-09-12 VITALS
RESPIRATION RATE: 14 BRPM | OXYGEN SATURATION: 96 % | DIASTOLIC BLOOD PRESSURE: 81 MMHG | BODY MASS INDEX: 29.8 KG/M2 | SYSTOLIC BLOOD PRESSURE: 133 MMHG | TEMPERATURE: 98.2 F | HEIGHT: 72 IN | HEART RATE: 72 BPM | WEIGHT: 220 LBS

## 2018-09-12 DIAGNOSIS — Z12.5 SCREENING FOR PROSTATE CANCER: ICD-10-CM

## 2018-09-12 DIAGNOSIS — M25.50 ARTHRALGIA OF MULTIPLE JOINTS: ICD-10-CM

## 2018-09-12 DIAGNOSIS — Z23 NEED FOR PROPHYLACTIC VACCINATION AND INOCULATION AGAINST VARICELLA: ICD-10-CM

## 2018-09-12 DIAGNOSIS — M62.830 MUSCLE SPASM OF BACK: ICD-10-CM

## 2018-09-12 DIAGNOSIS — J44.9 COPD MIXED TYPE (HCC): ICD-10-CM

## 2018-09-12 DIAGNOSIS — E78.2 HYPERLIPIDEMIA, MIXED: ICD-10-CM

## 2018-09-12 DIAGNOSIS — R73.09 ELEVATED GLUCOSE: ICD-10-CM

## 2018-09-12 DIAGNOSIS — Z23 NEED FOR INFLUENZA VACCINATION: ICD-10-CM

## 2018-09-12 DIAGNOSIS — I10 ESSENTIAL HYPERTENSION: Primary | ICD-10-CM

## 2018-09-12 DIAGNOSIS — J45.40 MODERATE PERSISTENT ASTHMA WITHOUT COMPLICATION: ICD-10-CM

## 2018-09-12 PROCEDURE — 3017F COLORECTAL CA SCREEN DOC REV: CPT | Performed by: FAMILY MEDICINE

## 2018-09-12 PROCEDURE — 1036F TOBACCO NON-USER: CPT | Performed by: FAMILY MEDICINE

## 2018-09-12 PROCEDURE — G8926 SPIRO NO PERF OR DOC: HCPCS | Performed by: FAMILY MEDICINE

## 2018-09-12 PROCEDURE — 90662 IIV NO PRSV INCREASED AG IM: CPT | Performed by: FAMILY MEDICINE

## 2018-09-12 PROCEDURE — G8417 CALC BMI ABV UP PARAM F/U: HCPCS | Performed by: FAMILY MEDICINE

## 2018-09-12 PROCEDURE — 4040F PNEUMOC VAC/ADMIN/RCVD: CPT | Performed by: FAMILY MEDICINE

## 2018-09-12 PROCEDURE — 1101F PT FALLS ASSESS-DOCD LE1/YR: CPT | Performed by: FAMILY MEDICINE

## 2018-09-12 PROCEDURE — 3023F SPIROM DOC REV: CPT | Performed by: FAMILY MEDICINE

## 2018-09-12 PROCEDURE — 1123F ACP DISCUSS/DSCN MKR DOCD: CPT | Performed by: FAMILY MEDICINE

## 2018-09-12 PROCEDURE — G0008 ADMIN INFLUENZA VIRUS VAC: HCPCS | Performed by: FAMILY MEDICINE

## 2018-09-12 PROCEDURE — G8427 DOCREV CUR MEDS BY ELIG CLIN: HCPCS | Performed by: FAMILY MEDICINE

## 2018-09-12 PROCEDURE — 99214 OFFICE O/P EST MOD 30 MIN: CPT | Performed by: FAMILY MEDICINE

## 2018-09-12 RX ORDER — HYDROCHLOROTHIAZIDE 25 MG/1
25 TABLET ORAL DAILY
Qty: 90 TABLET | Refills: 2 | Status: SHIPPED | OUTPATIENT
Start: 2018-09-12 | End: 2020-07-29 | Stop reason: ALTCHOICE

## 2018-09-12 RX ORDER — BUDESONIDE AND FORMOTEROL FUMARATE DIHYDRATE 80; 4.5 UG/1; UG/1
2 AEROSOL RESPIRATORY (INHALATION) 2 TIMES DAILY
Qty: 3 INHALER | Refills: 2 | Status: SHIPPED | OUTPATIENT
Start: 2018-09-12 | End: 2019-11-25 | Stop reason: SDUPTHER

## 2018-09-12 RX ORDER — METHOCARBAMOL 500 MG/1
500-1000 TABLET, FILM COATED ORAL EVERY 6 HOURS PRN
Qty: 120 TABLET | Refills: 2 | Status: SHIPPED | OUTPATIENT
Start: 2018-09-12 | End: 2019-11-25 | Stop reason: SDUPTHER

## 2018-09-12 RX ORDER — PRAVASTATIN SODIUM 80 MG/1
TABLET ORAL
Qty: 90 TABLET | Refills: 2 | Status: SHIPPED | OUTPATIENT
Start: 2018-09-12 | End: 2018-11-06 | Stop reason: ALTCHOICE

## 2018-09-12 RX ORDER — LISINOPRIL 5 MG/1
5 TABLET ORAL DAILY
Qty: 90 TABLET | Refills: 2 | Status: SHIPPED | OUTPATIENT
Start: 2018-09-12 | End: 2019-11-25 | Stop reason: SDUPTHER

## 2018-09-12 RX ORDER — DILTIAZEM HYDROCHLORIDE 240 MG/1
240 CAPSULE, EXTENDED RELEASE ORAL DAILY
Qty: 90 CAPSULE | Refills: 2 | Status: SHIPPED | OUTPATIENT
Start: 2018-09-12 | End: 2019-11-25 | Stop reason: SDUPTHER

## 2018-09-12 RX ORDER — CELECOXIB 100 MG/1
100 CAPSULE ORAL DAILY PRN
Qty: 90 CAPSULE | Refills: 2 | Status: SHIPPED | OUTPATIENT
Start: 2018-09-12 | End: 2019-11-25

## 2018-09-12 ASSESSMENT — ENCOUNTER SYMPTOMS
VOICE CHANGE: 0
EYE REDNESS: 0
SORE THROAT: 0
SHORTNESS OF BREATH: 0
NAUSEA: 0
ROS SKIN COMMENTS: NO NEW CONCERNING LESIONS
VOMITING: 0
EYE ITCHING: 0
RHINORRHEA: 0
BACK PAIN: 0
COUGH: 0
COLOR CHANGE: 0
ABDOMINAL PAIN: 0
DIARRHEA: 0
CONSTIPATION: 0
SINUS PRESSURE: 0
BLOOD IN STOOL: 0
WHEEZING: 0

## 2018-09-12 NOTE — PATIENT INSTRUCTIONS
tomato, cucumber, and onion to sandwiches. ¨ Combine a ready-made pizza crust with low-fat mozzarella cheese and lots of vegetable toppings. Try using tomatoes, squash, spinach, broccoli, carrots, cauliflower, and onions. ¨ Have a variety of cut-up vegetables with a low-fat dip as an appetizer instead of chips and dip. ¨ Sprinkle sunflower seeds or chopped almonds over salads. Or try adding chopped walnuts or almonds to cooked vegetables. ¨ Try some vegetarian meals using beans and peas. Add garbanzo or kidney beans to salads. Make burritos and tacos with mashed whitaker beans or black beans. Where can you learn more? Go to https://AdVolumerohinieb.Minka. org and sign in to your [facility] account. Enter D499 in the [employer] box to learn more about \"DASH Diet: Care Instructions. \"     If you do not have an account, please click on the \"Sign Up Now\" link. Current as of: December 6, 2017  Content Version: 11.7  © 9669-8469 [employer]. Care instructions adapted under license by [facility] ([facility]). If you have questions about a medical condition or this instruction, always ask your healthcare professional. [address] any warranty or liability for your use of this information. Patient Education        Prediabetes: Care Instructions  Your Care Instructions    Prediabetes is a warning sign that you are at risk for getting type 2 diabetes. It means that your blood sugar is higher than it should be. The food you eat turns into sugar, which your body uses for energy. Normally, an organ called the pancreas makes insulin, which allows the sugar in your blood to get into your body's cells. But when your body can't use insulin the right way, the sugar doesn't move into cells. It stays in your blood instead. This is called insulin resistance. The buildup of sugar in the blood causes prediabetes.   The good news is that lifestyle changes may help you get your blood sugar droplets of saliva that are expelled into the air when an infected person coughs or sneezes. The virus can also be passed through contact with objects the infected person has touched, such as a door handle or other surfaces. Influenza virus vaccine is used to prevent infection caused by influenza virus. The vaccine is redeveloped each year to contain specific strains of inactivated (killed) flu virus that are recommended by public health officials for that year. The injectable influenza virus vaccine (flu shot) is a \"killed virus\" vaccine. Influenza virus vaccine is also available in a nasal spray form, which is a \"live virus\" vaccine. Influenza virus vaccine works by exposing you to a small dose of the virus, which helps your body to develop immunity to the disease. Influenza virus vaccine will not treat an active infection that has already developed in the body. Influenza virus vaccine is for use in adults and children who are at least 7 months old. Becoming infected with influenza is much more dangerous to your health than receiving this vaccine. Influenza causes thousands of deaths each year, and hundreds of thousands of hospitalizations. However, like any medicine, this vaccine can cause side effects but the risk of serious side effects is extremely low. Like any vaccine, influenza virus vaccine may not provide protection from disease in every person. This vaccine will not prevent illness caused by aman flu (\"bird flu\"). What should I discuss with my healthcare provider before receiving this vaccine? You may not be able to receive this vaccine if you are allergic to eggs, or if you have:  · a history of severe allergic reaction to a flu vaccine; or  · a history of Guillain-Olive syndrome (within 6 weeks after receiving a flu vaccine).   To make sure this vaccine is safe for you, tell your doctor if you have ever had:  · a bleeding or blood clotting disorder such as hemophilia or easy bruising;  · a for the next 24 hours. Follow the label directions or your doctor's instructions about how much of this medicine to give your child. It is especially important to prevent fever from occurring in a child who has a seizure disorder such as epilepsy. What happens if I miss a dose? Since flu shots are usually given only one time per year, you will most likely not be on a dosing schedule. Call your doctor if you forget to receive your yearly flu shot in October or November. If your child misses a booster dose of this vaccine, call your doctor for instructions. What happens if I overdose? An overdose of this vaccine is unlikely to occur. What should I avoid before or after receiving this vaccine? Follow your doctor's instructions about any restrictions on food, beverages, or activity. What are the possible side effects of influenza virus injectable vaccine? Get emergency medical help if you have signs of an allergic reaction: hives; difficulty breathing; swelling of your face, lips, tongue, or throat. You should not receive a booster vaccine if you had a life-threatening allergic reaction after the first shot. Keep track of any and all side effects you have after receiving this vaccine. If you ever need to receive influenza virus vaccine in the future, you will need to tell your doctor if the previous shot caused any side effects. Influenza virus injectable (killed virus) vaccine will not cause you to become ill with the flu virus that it contains. However, you may have flu-like symptoms at any time during flu season that may be caused by other strains of influenza virus. Call your doctor at once if you have:  · a light-headed feeling, like you might pass out;  · severe weakness or unusual feeling in your arms and legs (may occur 2 to 4 weeks after you receive the vaccine);  · high fever;  · seizure (convulsions); or  · unusual bleeding.   Common side effects may include:  · low fever, chills;  · mild

## 2018-09-12 NOTE — PROGRESS NOTES
arthritis.) take 1 capsule by mouth once daily 90 capsule 2    pravastatin (PRAVACHOL) 80 MG tablet take 1 tablet by mouth once daily 90 tablet 2    lisinopril (PRINIVIL;ZESTRIL) 5 MG tablet Take 1 tablet by mouth daily 90 tablet 2    hydrochlorothiazide (HYDRODIURIL) 25 MG tablet Take 1 tablet by mouth daily 90 tablet 2    methocarbamol (ROBAXIN) 500 MG tablet Take 1-2 tablets by mouth every 6 hours as needed (muscle spasm.) Sedation warning. 120 tablet 2    niacin (SLO-NIACIN) 500 MG extended release tablet Take 1 tablet by mouth daily 90 tablet 1    albuterol sulfate  (90 Base) MCG/ACT inhaler Inhale 2 puffs into the lungs every 6 hours as needed for Wheezing or Shortness of Breath 3 Inhaler 1    Aspirin Buf,DiQst-PkJgh-LeYyh, (BUFFERED ASPIRIN) 325 MG TABS Take by mouth      Psyllium (METAMUCIL PO) Take by mouth Indications: 2 tablets twice daily       B Complex Vitamins (VITAMIN-B COMPLEX PO) Take by mouth 2 times daily       Coenzyme Q10 (COQ10) 100 MG CAPS Take 100 mg by mouth. One daily        Multiple Vitamin (MULTIVITAMIN PO) Take  by mouth. One daily         Omega-3 Fatty Acids (FISH OIL) 1000 MG CPDR Take 3 tablets by mouth Daily.  Glucosamine-Chondroit-Vit C-Mn (GLUCOSAMINE CHONDR 1500 COMPLX PO) Take 1,500 mg by mouth. One daily         No current facility-administered medications for this visit. Note review of PMH, PSH, PFH, social and immunizations.     Past Medical History:   Diagnosis Date    Arthritis     Asthma     Bell's palsy     [facility]    Chicken pox     Chronic back pain     Chronic kidney disease     COPD (chronic obstructive pulmonary disease) (HCC)     Diverticular disease     Environmental allergies     Heart disease     has stent    Hyperlipidemia     Measles     Mumps      Past Surgical History:   Procedure Laterality Date    COLONOSCOPY  05/2015    Dr Flaco Argueta,  normal, recheck 10 years     CORONARY ANGIOPLASTY WITH swelling. Gastrointestinal: Negative for abdominal pain, blood in stool, constipation, diarrhea, nausea and vomiting. Genitourinary: Negative for discharge, dysuria, frequency, genital sores, hematuria, testicular pain and urgency. Musculoskeletal: Negative for arthralgias, back pain, joint swelling and myalgias. Skin: Negative for color change and rash. No new concerning lesions   Neurological: Negative for dizziness, syncope, weakness, numbness and headaches. Hematological: Negative. Negative for adenopathy. Does not bruise/bleed easily. Psychiatric/Behavioral: Negative. Negative for decreased concentration, sleep disturbance and suicidal ideas. The patient is not nervous/anxious. No depression       Objective:     Physical Exam   Constitutional: He is oriented to person, place, and time. He appears well-developed and well-nourished. No distress. /81   Pulse 72   Temp 98.2 °F (36.8 °C) (Oral)   Resp 14   Ht 6' (1.829 m)   Wt 220 lb (99.8 kg)   SpO2 96%   BMI 29.84 kg/m²      HENT:   Head: Normocephalic. Right Ear: Tympanic membrane and ear canal normal.   Left Ear: Tympanic membrane and ear canal normal.   Nose: Nose normal. No rhinorrhea. Mouth/Throat: No oral lesions. No oropharyngeal exudate, posterior oropharyngeal edema or posterior oropharyngeal erythema. Neck: No JVD present. No thyromegaly present. Cardiovascular: Normal rate, regular rhythm, normal heart sounds and intact distal pulses. No murmur heard. Pulmonary/Chest: Effort normal. No respiratory distress. He has decreased breath sounds (Minimally overall fairly normal.). He has no wheezes. He has no rhonchi. He has no rales. Abdominal: Soft. Bowel sounds are normal. He exhibits no distension and no mass. There is no tenderness. Musculoskeletal: He exhibits no edema (neg Homans'). No peripheral joints with some relatively mild tach chronic changes no acute changes.     ongoing chronic changes of the spine. minimal muscle spasm. Lymphadenopathy:     He has no cervical adenopathy. Neurological: He is alert and oriented to person, place, and time. He exhibits normal muscle tone. Skin: No rash noted. Vitals reviewed. Assessment:      Diagnosis Orders   1. Essential hypertension  diltiazem (TAZTIA XT) 240 MG extended release capsule    lisinopril (PRINIVIL;ZESTRIL) 5 MG tablet    hydrochlorothiazide (HYDRODIURIL) 25 MG tablet    CBC Auto Differential    Comprehensive Metabolic Panel   2. COPD mixed type ([address]  budesonide-formoterol (SYMBICORT) 80-4.5 MCG/ACT AERO    Comprehensive Metabolic Panel   3. Moderate persistent asthma without complication  budesonide-formoterol (SYMBICORT) 80-4.5 MCG/ACT AERO    Comprehensive Metabolic Panel   4. Arthralgia of multiple joints  celecoxib (CELEBREX) 100 MG capsule    Comprehensive Metabolic Panel   5. Hyperlipidemia, mixed  pravastatin (PRAVACHOL) 80 MG tablet    Comprehensive Metabolic Panel    Lipid Panel   6. Elevated glucose  Hemoglobin A1C   7. Muscle spasm of back  methocarbamol (ROBAXIN) 500 MG tablet   8. Need for prophylactic vaccination and inoculation against varicella  zoster recombinant adjuvanted vaccine (SHINGRIX) 50 MCG SUSR injection   9. Screening for prostate cancer  Psa screening   10. Need for influenza vaccination  INFLUENZA, HIGH DOSE, 65 YRS +, IM, PF, PREFILL SYR, 0.5ML (FLUZONE HD)       Plan:     Case thoroughly discussed with patient and proposed management and DDg. Patient Instructions     Continue Diet low salt, high fiber, avoiding concentrated sweets  Avoid all foods with high fructose corn syrup. Continue fluids (water based) regularly. Continue activity and exercise as tolerated. Continue present medications as ordered, and refilled. Note the flu shot today.      Also note the orders for the labs, to be done about 12/8/18, Call on test results after done if you do not hear from us in one cheese. · Eat 6 to 8 servings of grains each day. A serving is 1 slice of bread, 1 ounce of dry cereal, or ½ cup of cooked rice, pasta, or cooked cereal. Try to choose whole-grain products as much as possible. · Limit lean meat, poultry, and fish to 2 servings each day. A serving is 3 ounces, about the size of a deck of cards. · Eat 4 to 5 servings of nuts, seeds, and legumes (cooked dried beans, lentils, and split peas) each week. A serving is 1/3 cup of nuts, 2 tablespoons of seeds, or ½ cup of cooked beans or peas. · Limit fats and oils to 2 to 3 servings each day. A serving is 1 teaspoon of vegetable oil or 2 tablespoons of salad dressing. · Limit sweets and added sugars to 5 servings or less a week. A serving is 1 tablespoon jelly or jam, ½ cup sorbet, or 1 cup of lemonade. · Eat less than 2,300 milligrams (mg) of sodium a day. If you limit your sodium to 1,500 mg a day, you can lower your blood pressure even more. Tips for success  · Start small. Do not try to make dramatic changes to your diet all at once. You might feel that you are missing out on your favorite foods and then be more likely to not follow the plan. Make small changes, and stick with them. Once those changes become habit, add a few more changes. · Try some of the following:  ¨ Make it a goal to eat a fruit or vegetable at every meal and at snacks. This will make it easy to get the recommended amount of fruits and vegetables each day. ¨ Try yogurt topped with fruit and nuts for a snack or healthy dessert. ¨ Add lettuce, tomato, cucumber, and onion to sandwiches. ¨ Combine a ready-made pizza crust with low-fat mozzarella cheese and lots of vegetable toppings. Try using tomatoes, squash, spinach, broccoli, carrots, cauliflower, and onions. ¨ Have a variety of cut-up vegetables with a low-fat dip as an appetizer instead of chips and dip. ¨ Sprinkle sunflower seeds or chopped almonds over salads.  Or try adding chopped walnuts or infection, wait until you get better before receiving this vaccine. The Centers for Disease Control and Prevention recommends that pregnant women get a flu shot during any trimester of pregnancy to protect themselves and their  babies from flu. The nasal spray form of influenza vaccine is not recommended for use in pregnant women. It is not known whether influenza virus vaccine passes into breast milk or if it could harm a nursing baby. Do not receive this vaccine without telling your doctor if you are breast-feeding a baby. This vaccine should not be given to a child younger than 7 months old. How is this vaccine given? Some brands of this vaccine are made for use in adults and not in children. Your child's doctor can recommend the best influenza virus vaccine for your child. This vaccine is given as an injection (shot) into a muscle. You will receive this injection in a doctor's office or other clinic setting. You should receive a flu vaccine every year. Your immunity will gradually decrease over the 12 months after you receive the influenza virus vaccine. Children receiving this vaccine may need a booster shot one month after receiving the first vaccine. The influenza virus vaccine is usually given in October or November. Some people may need to have their vaccines earlier or later. Follow your doctor's instructions. Your doctor may recommend treating fever and pain with an aspirin-free pain reliever such as acetaminophen (Tylenol) or ibuprofen (Motrin, Advil, and others) when the shot is given and for the next 24 hours. Follow the label directions or your doctor's instructions about how much of this medicine to give your child. It is especially important to prevent fever from occurring in a child who has a seizure disorder such as epilepsy. What happens if I miss a dose? Since flu shots are usually given only one time per year, you will most likely not be on a dosing schedule.  Call your affect influenza virus injectable vaccine? If you are using any of the following medicines, you may not be able to receive the vaccine, or may need to wait until the other treatments are finished:  · phenytoin, theophylline, or warfarin (Coumadin, Jantoven);  · an oral, nasal, inhaled, or injectable steroid medicine;  · medications to treat psoriasis, rheumatoid arthritis, or other autoimmune disorders--azathioprine, etanercept, leflunomide, and others; or  · medicines to treat or prevent organ transplant rejection--basiliximab, cyclosporine, muromonab-CD3, mycophenolate mofetil, sirolimus, tacrolimus. This list is not complete. Other drugs may interact with influenza virus vaccine, including prescription and over-the-counter medicines, vitamins, and herbal products. Not all possible interactions are listed in this medication guide. Where can I get more information? Your doctor or pharmacist can provide more information about this vaccine. Additional information is available from your local health department or the Centers for Disease Control and Prevention. Remember, keep this and all other medicines out of the reach of children, never share your medicines with others, and use this medication only for the indication prescribed. Every effort has been made to ensure that the information provided by [address] is accurate, up-to-date, and complete, but no guarantee is made to that effect. Drug information contained herein may be time sensitive. [facility] information has been compiled for use by healthcare practitioners and consumers in the [place] and therefore [facility] does not warrant that uses outside of the [place] are appropriate, unless specifically indicated otherwise. [facility]'s drug information does not endorse drugs, diagnose patients or recommend therapy.  [facility]'s drug information is an informational resource designed to assist licensed healthcare practitioners in caring for their patients and/or to serve consumers viewing this service as a supplement to, and not a substitute for, the expertise, skill, knowledge and judgment of healthcare practitioners. The absence of a warning for a given drug or drug combination in no way should be construed to indicate that the drug or drug combination is safe, effective or appropriate for any given patient. [facility] does not assume any responsibility for any aspect of healthcare administered with the aid of information [facility] provides. The information contained herein is not intended to cover all possible uses, directions, precautions, warnings, drug interactions, allergic reactions, or adverse effects. If you have questions about the drugs you are taking, check with your doctor, nurse or pharmacist.  Copyright 3499-9257 [address]. Version: 7.12. Revision date: 2017. Care instructions adapted under license by [facility] ([facility]). If you have questions about a medical condition or this instruction, always ask your healthcare professional. [place] [address] any warranty or liability for your use of this information.            Orders Placed This Encounter   Procedures    INFLUENZA, HIGH DOSE, 65 YRS +, IM, PF, PREFILL SYR, 0.5ML (FLUZONE HD)    CBC Auto Differential     Standing Status:   Future     Standing Expiration Date:   2019    Comprehensive Metabolic Panel     Standing Status:   Future     Standing Expiration Date:   2019    Lipid Panel     Standing Status:   Future     Standing Expiration Date:   2019     Order Specific Question:   Is Patient Fasting?/# of Hours     Answer:   Yes - 12 hours    Psa screening     Standing Status:   Future     Standing Expiration Date:   2019    Hemoglobin A1C     Standing Status:   Future     Standing Expiration Date:   2019        Orders Placed This Encounter   Medications    zoster recombinant adjuvanted vaccine (SHINGRIX) 50 MCG SUSR injection     Si

## 2018-09-12 NOTE — PROGRESS NOTES
Chronic Disease Visit Information    BP Readings from Last 3 Encounters:   11/27/17 138/82   11/14/17 139/76   09/11/17 131/75          Hemoglobin A1C (%)   Date Value   12/21/2017 5.5     LDL Cholesterol (mg/dL)   Date Value   12/21/2017 124     HDL (mg/dL)   Date Value   12/21/2017 70     BUN (mg/dL)   Date Value   12/21/2017 18     CREATININE (mg/dL)   Date Value   12/21/2017 1.15     Glucose (mg/dL)   Date Value   12/21/2017 115 (H)   02/18/2012 99            Have you changed or started any medications since your last visit including any over-the-counter medicines, vitamins, or herbal medicines? no   Are you having any side effects from any of your medications? -  no  Have you stopped taking any of your medications? Is so, why? -  no    Have you seen any other physician or provider since your last visit? Yes - Records Requested  Cardiologist   Dr Shiraz Barksdale   Have you had any other diagnostic tests since your last visit? Yes - Records Obtained  Have you been seen in the emergency room and/or had an admission to a hospital since we last saw you? No  Have you had your annual diabetic retinal (eye) exam? No  Have you had your routine dental cleaning in the past 6 months? yes     Have you activated your [employer] account? If not, what are your barriers?  Yes     Patient Care Team:  Kary Ramirez DO as PCP - General (Family Medicine)  Aryan Sebastian MD as Surgeon (Internal Medicine Cardiovascular Disease)         Medical History Review  Past Medical, Family, and Social History reviewed and does not contribute to the patient presenting condition    Health Maintenance   Topic Date Due    Shingles Vaccine (1 of 2 - 2 Dose Series) 10/09/2002    Colon cancer screen colonoscopy  10/09/2002    DTaP/Tdap/Td vaccine (1 - Tdap) 11/26/2007    Flu vaccine (1) 09/01/2018    Pneumococcal low/med risk (2 of 2 - PPSV23) 11/14/2018    Potassium monitoring  12/21/2018    Creatinine monitoring  12/21/2018    Diabetes screen  12/21/2020    Lipid screen  12/21/2022    AAA screen  Completed    Hepatitis C screen  Completed    HIV screen  Completed

## 2018-10-05 ENCOUNTER — OFFICE VISIT (OUTPATIENT)
Dept: FAMILY MEDICINE CLINIC | Age: 68
End: 2018-10-05
Payer: MEDICARE

## 2018-10-05 VITALS
DIASTOLIC BLOOD PRESSURE: 80 MMHG | HEART RATE: 69 BPM | HEIGHT: 72 IN | RESPIRATION RATE: 16 BRPM | SYSTOLIC BLOOD PRESSURE: 137 MMHG | WEIGHT: 221 LBS | OXYGEN SATURATION: 96 % | BODY MASS INDEX: 29.93 KG/M2

## 2018-10-05 DIAGNOSIS — R10.32 LLQ ABDOMINAL PAIN: ICD-10-CM

## 2018-10-05 DIAGNOSIS — Z76.89 ESTABLISHING CARE WITH NEW DOCTOR, ENCOUNTER FOR: ICD-10-CM

## 2018-10-05 DIAGNOSIS — I25.119 CORONARY ARTERY DISEASE INVOLVING NATIVE CORONARY ARTERY OF NATIVE HEART WITH ANGINA PECTORIS (HCC): ICD-10-CM

## 2018-10-05 DIAGNOSIS — E78.2 HYPERLIPIDEMIA, MIXED: ICD-10-CM

## 2018-10-05 DIAGNOSIS — I10 ESSENTIAL HYPERTENSION: ICD-10-CM

## 2018-10-05 DIAGNOSIS — Z00.00 VISIT FOR WELL MAN HEALTH CHECK: Primary | ICD-10-CM

## 2018-10-05 DIAGNOSIS — J44.9 COPD MIXED TYPE (HCC): ICD-10-CM

## 2018-10-05 DIAGNOSIS — Z23 NEED FOR 23-POLYVALENT PNEUMOCOCCAL POLYSACCHARIDE VACCINE: ICD-10-CM

## 2018-10-05 PROCEDURE — 1123F ACP DISCUSS/DSCN MKR DOCD: CPT | Performed by: FAMILY MEDICINE

## 2018-10-05 PROCEDURE — 1036F TOBACCO NON-USER: CPT | Performed by: FAMILY MEDICINE

## 2018-10-05 PROCEDURE — 1101F PT FALLS ASSESS-DOCD LE1/YR: CPT | Performed by: FAMILY MEDICINE

## 2018-10-05 PROCEDURE — G8417 CALC BMI ABV UP PARAM F/U: HCPCS | Performed by: FAMILY MEDICINE

## 2018-10-05 PROCEDURE — G8926 SPIRO NO PERF OR DOC: HCPCS | Performed by: FAMILY MEDICINE

## 2018-10-05 PROCEDURE — 99213 OFFICE O/P EST LOW 20 MIN: CPT | Performed by: FAMILY MEDICINE

## 2018-10-05 PROCEDURE — G8482 FLU IMMUNIZE ORDER/ADMIN: HCPCS | Performed by: FAMILY MEDICINE

## 2018-10-05 PROCEDURE — G8598 ASA/ANTIPLAT THER USED: HCPCS | Performed by: FAMILY MEDICINE

## 2018-10-05 PROCEDURE — G8427 DOCREV CUR MEDS BY ELIG CLIN: HCPCS | Performed by: FAMILY MEDICINE

## 2018-10-05 PROCEDURE — 3023F SPIROM DOC REV: CPT | Performed by: FAMILY MEDICINE

## 2018-10-05 PROCEDURE — 4040F PNEUMOC VAC/ADMIN/RCVD: CPT | Performed by: FAMILY MEDICINE

## 2018-10-05 PROCEDURE — G0009 ADMIN PNEUMOCOCCAL VACCINE: HCPCS | Performed by: FAMILY MEDICINE

## 2018-10-05 PROCEDURE — 3017F COLORECTAL CA SCREEN DOC REV: CPT | Performed by: FAMILY MEDICINE

## 2018-10-05 PROCEDURE — 90732 PPSV23 VACC 2 YRS+ SUBQ/IM: CPT | Performed by: FAMILY MEDICINE

## 2018-10-05 ASSESSMENT — ENCOUNTER SYMPTOMS
ABDOMINAL PAIN: 1
ABDOMINAL DISTENTION: 0
CONSTIPATION: 0
RECTAL PAIN: 0
EYE PAIN: 0
VOMITING: 0
BLOOD IN STOOL: 0
SHORTNESS OF BREATH: 0
DIARRHEA: 0
ANAL BLEEDING: 0

## 2018-10-05 ASSESSMENT — PATIENT HEALTH QUESTIONNAIRE - PHQ9
SUM OF ALL RESPONSES TO PHQ QUESTIONS 1-9: 0
2. FEELING DOWN, DEPRESSED OR HOPELESS: 0
SUM OF ALL RESPONSES TO PHQ QUESTIONS 1-9: 0
SUM OF ALL RESPONSES TO PHQ9 QUESTIONS 1 & 2: 0
1. LITTLE INTEREST OR PLEASURE IN DOING THINGS: 0

## 2018-10-05 NOTE — PROGRESS NOTES
distension and no mass. There is tenderness. There is no rebound and no guarding. Minimal pain on palpation over the left lower quadrant, no rebound or guarding, no palpable masses   Musculoskeletal: Normal range of motion. He exhibits no edema. Lymphadenopathy:     He has no cervical adenopathy. Neurological: He is alert and oriented to person, place, and time. Skin: Skin is warm. No rash noted. He is not diaphoretic. Psychiatric: He has a normal mood and affect. His behavior is normal. Judgment and thought content normal.       Assessment & Plan:      1. Establishing care with new doctor, encounter for    2. Visit for well man health check  Recommended healthy diet / daily exercise, avoidance of alcohol / smoking / recreational drugs  Recommended bi-annual dental exam / yearly vision exam   Colonoscopy after age 55    4. Coronary artery disease involving native coronary artery of native heart with angina pectoris [facility])  Continue follow-up with cardiology    4. Need for 23-polyvalent pneumococcal polysaccharide vaccine  - Pneumococcal polysaccharide vaccine 23-valent greater than or equal to 1yo subcutaneous/IM    5. Essential hypertension  Blood pressure well-controlled    6. COPD mixed type (HCC)  Continue Symbicort    7. Hyperlipidemia, mixed  Continue pravastatin. Discussed lack of cardiovascular benefits with niacin especially given his normal triglycerides of the last 2 years. He will come off of it. 8. LLQ abdominal pain  Etiology unclear, reassuring that symptoms are much improved and that exam is unremarkable. Could have been a flare from his diverticuli. He will monitor his symptoms, should the pain persist or worsen he will seek medical attention. Educated on red flags. Fit test given  We will contact the GI specialist at [address] for his colonoscopy report from 2015    Call or return to clinic prn if these symptoms worsen or fail to improve as anticipated.   I have reviewed the

## 2018-10-12 DIAGNOSIS — Z00.00 VISIT FOR WELL MAN HEALTH CHECK: ICD-10-CM

## 2018-10-12 DIAGNOSIS — R19.5 POSITIVE FIT (FECAL IMMUNOCHEMICAL TEST): Primary | ICD-10-CM

## 2018-10-12 LAB
CONTROL: PRESENT
HEMOCCULT STL QL: POSITIVE

## 2018-10-12 PROCEDURE — 82274 ASSAY TEST FOR BLOOD FECAL: CPT | Performed by: FAMILY MEDICINE

## 2018-10-22 ENCOUNTER — PATIENT MESSAGE (OUTPATIENT)
Dept: FAMILY MEDICINE CLINIC | Age: 68
End: 2018-10-22

## 2018-10-23 NOTE — TELEPHONE ENCOUNTER
From: Amie Hightower  To: Dee Dee Baldwin MD  Sent: 10/22/2018 9:51 PM EDT  Subject: Visit Follow-Up Question    WHY WAS MY OFFICE VISIT DATED 10/5/2018 BILLED TO MEDICARE AS  ANNUAL WELLNESS VISIT? THE VISIT HAS ALREADY BEEN PROCESSED AND PAID FOR BY MEDICARE. IT WAS NOT A WELLNESS VISIT. THE APPOINTMENT WAS MADE TO SPECIFICALLY ADDRESS AN ABDOMINAL PAIN, AND THAT WAS ALL THAT WAS DISCUSSED. MY ANNUAL WELLNESS VISIT IS CURRENTLY SCHEDULED ON NOVEMBER 12, 2018 8:15AM! MEDICARE ONLY PERMITS ONE WELLNESS VISIT PER YEAR. WHO IS GOING TO PAY FOR IT? I DO EXPECT AN ANSWER BACK.

## 2018-11-05 ENCOUNTER — HOSPITAL ENCOUNTER (OUTPATIENT)
Age: 68
Discharge: HOME | End: 2018-11-05
Payer: MEDICARE

## 2018-11-05 DIAGNOSIS — E78.2 HYPERLIPIDEMIA, MIXED: ICD-10-CM

## 2018-11-05 DIAGNOSIS — R73.09 ELEVATED GLUCOSE: ICD-10-CM

## 2018-11-05 DIAGNOSIS — J44.9 COPD MIXED TYPE (HCC): ICD-10-CM

## 2018-11-05 DIAGNOSIS — Z12.5 SCREENING FOR PROSTATE CANCER: ICD-10-CM

## 2018-11-05 DIAGNOSIS — J45.40 MODERATE PERSISTENT ASTHMA WITHOUT COMPLICATION: ICD-10-CM

## 2018-11-05 DIAGNOSIS — M25.50 ARTHRALGIA OF MULTIPLE JOINTS: ICD-10-CM

## 2018-11-05 DIAGNOSIS — I10 ESSENTIAL HYPERTENSION: ICD-10-CM

## 2018-11-05 LAB
ABSOLUTE EOS #: 0.26 K/UL (ref 0–0.44)
ABSOLUTE IMMATURE GRANULOCYTE: 0.05 K/UL (ref 0–0.3)
ABSOLUTE LYMPH #: 1.91 K/UL (ref 1.1–3.7)
ABSOLUTE MONO #: 0.58 K/UL (ref 0.1–1.2)
ALBUMIN SERPL-MCNC: 4.7 G/DL (ref 3.5–5.2)
ALBUMIN/GLOBULIN RATIO: 1.6 (ref 1–2.5)
ALP BLD-CCNC: 45 U/L (ref 40–129)
ALT SERPL-CCNC: 30 U/L (ref 5–41)
ANION GAP SERPL CALCULATED.3IONS-SCNC: 11 MMOL/L (ref 9–17)
AST SERPL-CCNC: 31 U/L
BASOPHILS # BLD: 1 % (ref 0–2)
BASOPHILS ABSOLUTE: 0.04 K/UL (ref 0–0.2)
BILIRUB SERPL-MCNC: 0.53 MG/DL (ref 0.3–1.2)
BUN BLDV-MCNC: 19 MG/DL (ref 8–23)
BUN/CREAT BLD: ABNORMAL (ref 9–20)
CALCIUM SERPL-MCNC: 9.7 MG/DL (ref 8.6–10.4)
CHLORIDE BLD-SCNC: 101 MMOL/L (ref 98–107)
CHOLESTEROL/HDL RATIO: 3.4
CHOLESTEROL: 218 MG/DL
CO2: 27 MMOL/L (ref 20–31)
CREAT SERPL-MCNC: 1.18 MG/DL (ref 0.7–1.2)
DIFFERENTIAL TYPE: ABNORMAL
EOSINOPHILS RELATIVE PERCENT: 4 % (ref 1–4)
GFR AFRICAN AMERICAN: >60 ML/MIN
GFR NON-AFRICAN AMERICAN: >60 ML/MIN
GFR SERPL CREATININE-BSD FRML MDRD: ABNORMAL ML/MIN/{1.73_M2}
GFR SERPL CREATININE-BSD FRML MDRD: ABNORMAL ML/MIN/{1.73_M2}
GLUCOSE BLD-MCNC: 103 MG/DL (ref 70–99)
HCT VFR BLD CALC: 47.1 % (ref 40.7–50.3)
HDLC SERPL-MCNC: 64 MG/DL
HEMOGLOBIN: 15.5 G/DL (ref 13–17)
IMMATURE GRANULOCYTES: 1 %
LDL CHOLESTEROL: 128 MG/DL (ref 0–130)
LYMPHOCYTES # BLD: 32 % (ref 24–43)
MCH RBC QN AUTO: 33.3 PG (ref 25.2–33.5)
MCHC RBC AUTO-ENTMCNC: 33.8 G/DL (ref 28.4–34.8)
MCV RBC AUTO: 98.7 FL (ref 82.6–102.9)
MONOCYTES # BLD: 10 % (ref 3–12)
NRBC AUTOMATED: 0 PER 100 WBC
PDW BLD-RTO: 13.2 % (ref 11.8–14.4)
PLATELET # BLD: 234 K/UL (ref 138–453)
PLATELET ESTIMATE: ABNORMAL
PMV BLD AUTO: 11 FL (ref 8.1–13.5)
POTASSIUM SERPL-SCNC: 3.7 MMOL/L (ref 3.7–5.3)
PROSTATE SPECIFIC ANTIGEN: 1.61 UG/L
RBC # BLD: 4.77 M/UL (ref 4.21–5.77)
RBC # BLD: ABNORMAL 10*6/UL
SEG NEUTROPHILS: 52 % (ref 36–65)
SEGMENTED NEUTROPHILS ABSOLUTE COUNT: 3.06 K/UL (ref 1.5–8.1)
SODIUM BLD-SCNC: 139 MMOL/L (ref 135–144)
TOTAL PROTEIN: 7.6 G/DL (ref 6.4–8.3)
TRIGL SERPL-MCNC: 128 MG/DL
VLDLC SERPL CALC-MCNC: ABNORMAL MG/DL (ref 1–30)
WBC # BLD: 5.9 K/UL (ref 3.5–11.3)
WBC # BLD: ABNORMAL 10*3/UL

## 2018-11-05 PROCEDURE — 36415 COLL VENOUS BLD VENIPUNCTURE: CPT

## 2018-11-05 PROCEDURE — 80053 COMPREHEN METABOLIC PANEL: CPT

## 2018-11-05 PROCEDURE — 80061 LIPID PANEL: CPT

## 2018-11-05 PROCEDURE — 83036 HEMOGLOBIN GLYCOSYLATED A1C: CPT

## 2018-11-05 PROCEDURE — G0103 PSA SCREENING: HCPCS

## 2018-11-05 PROCEDURE — 85025 COMPLETE CBC W/AUTO DIFF WBC: CPT

## 2018-11-06 DIAGNOSIS — E78.2 HYPERLIPIDEMIA, MIXED: Primary | ICD-10-CM

## 2018-11-06 LAB
ESTIMATED AVERAGE GLUCOSE: 108 MG/DL
HBA1C MFR BLD: 5.4 % (ref 4–6)

## 2018-11-06 RX ORDER — ROSUVASTATIN CALCIUM 20 MG/1
20 TABLET, COATED ORAL NIGHTLY
Qty: 90 TABLET | Refills: 3 | Status: SHIPPED | OUTPATIENT
Start: 2018-11-06 | End: 2018-11-12 | Stop reason: SDUPTHER

## 2018-11-12 ENCOUNTER — OFFICE VISIT (OUTPATIENT)
Dept: FAMILY MEDICINE CLINIC | Age: 68
End: 2018-11-12
Payer: MEDICARE

## 2018-11-12 VITALS
OXYGEN SATURATION: 95 % | HEART RATE: 62 BPM | WEIGHT: 216 LBS | BODY MASS INDEX: 29.26 KG/M2 | RESPIRATION RATE: 16 BRPM | HEIGHT: 72 IN | DIASTOLIC BLOOD PRESSURE: 80 MMHG | SYSTOLIC BLOOD PRESSURE: 130 MMHG

## 2018-11-12 DIAGNOSIS — Z00.00 ROUTINE GENERAL MEDICAL EXAMINATION AT A HEALTH CARE FACILITY: Primary | ICD-10-CM

## 2018-11-12 DIAGNOSIS — E78.2 HYPERLIPIDEMIA, MIXED: ICD-10-CM

## 2018-11-12 PROCEDURE — 4040F PNEUMOC VAC/ADMIN/RCVD: CPT | Performed by: FAMILY MEDICINE

## 2018-11-12 PROCEDURE — G8482 FLU IMMUNIZE ORDER/ADMIN: HCPCS | Performed by: FAMILY MEDICINE

## 2018-11-12 PROCEDURE — G8598 ASA/ANTIPLAT THER USED: HCPCS | Performed by: FAMILY MEDICINE

## 2018-11-12 PROCEDURE — G0439 PPPS, SUBSEQ VISIT: HCPCS | Performed by: FAMILY MEDICINE

## 2018-11-12 RX ORDER — ROSUVASTATIN CALCIUM 20 MG/1
20 TABLET, COATED ORAL NIGHTLY
Qty: 90 TABLET | Refills: 3 | Status: SHIPPED | OUTPATIENT
Start: 2018-11-12 | End: 2019-11-25 | Stop reason: SDUPTHER

## 2018-11-12 ASSESSMENT — PATIENT HEALTH QUESTIONNAIRE - PHQ9
SUM OF ALL RESPONSES TO PHQ QUESTIONS 1-9: 0
SUM OF ALL RESPONSES TO PHQ QUESTIONS 1-9: 0

## 2018-11-12 ASSESSMENT — LIFESTYLE VARIABLES
HOW OFTEN DO YOU HAVE A DRINK CONTAINING ALCOHOL: 3
AUDIT-C TOTAL SCORE: 5
HOW OFTEN DO YOU HAVE SIX OR MORE DRINKS ON ONE OCCASION: 1
HOW MANY STANDARD DRINKS CONTAINING ALCOHOL DO YOU HAVE ON A TYPICAL DAY: 1

## 2018-11-12 ASSESSMENT — ANXIETY QUESTIONNAIRES: GAD7 TOTAL SCORE: 0

## 2018-11-12 NOTE — PROGRESS NOTES
daily   Yes Erna Portillo MD   Multiple Vitamin (MULTIVITAMIN PO) Take  by mouth. One daily    Yes Historical Provider, MD   Omega-3 Fatty Acids (FISH OIL) 1000 MG CPDR Take 3 tablets by mouth Daily. Yes Historical Provider, MD   zoster recombinant adjuvanted vaccine (SHINGRIX) 50 MCG SUSR injection 50 MCG IM then repeat 2-6 months. Mirta Oliveira DO       Past Medical History:   Diagnosis Date    Arthritis     Asthma     Bell's palsy     [facility]    Chicken pox     Chronic back pain     Chronic kidney disease     COPD (chronic obstructive pulmonary disease) (HCC)     Diverticular disease     Environmental allergies     Heart disease     has stent    Hyperlipidemia     Measles     Mumps      Past Surgical History:   Procedure Laterality Date    COLONOSCOPY  05/2015    Dr Sancho Courtney,  normal, recheck 10 years     CORONARY ANGIOPLASTY WITH STENT PLACEMENT  2009    FRACTURE SURGERY Left 11/2011    hand metacarple, in [facility]  [id number]    umbilical    NECK SURGERY  2000    benign lump    TONSILLECTOMY  age 12    VASECTOMY         Family History   Problem Relation Age of Onset    High Blood Pressure Mother     High Cholesterol Mother     Alzheimer's Disease Mother     High Cholesterol Father     High Blood Pressure Father        CareTeam (Including outside providers/suppliers regularly involved in providing care):   Patient Care Team:  Erica Lopez MD as PCP - Devora Murphy MD as Surgeon (Internal Medicine Cardiovascular Disease)    Wt Readings from Last 3 Encounters:   11/12/18 216 lb (98 kg)   10/05/18 221 lb (100.2 kg)   09/12/18 220 lb (99.8 kg)     Vitals:    11/12/18 0820 11/12/18 0822   BP: (!) 145/84 130/80   Pulse: 62    Resp: 16    SpO2: 95%    Weight: 216 lb (98 kg)    Height: 6' 0.01\" (1.829 m)      Body mass index is 29.29 kg/m².       Patient's complete Health Risk Assessment and screening values have been

## 2018-11-14 ENCOUNTER — PATIENT MESSAGE (OUTPATIENT)
Dept: FAMILY MEDICINE CLINIC | Age: 68
End: 2018-11-14

## 2018-11-14 PROBLEM — R19.5 POSITIVE FIT (FECAL IMMUNOCHEMICAL TEST): Status: ACTIVE | Noted: 2018-11-14

## 2018-11-16 ENCOUNTER — TELEPHONE (OUTPATIENT)
Dept: GASTROENTEROLOGY | Age: 68
End: 2018-11-16

## 2018-11-16 ENCOUNTER — OFFICE VISIT (OUTPATIENT)
Dept: GASTROENTEROLOGY | Age: 68
End: 2018-11-16
Payer: MEDICARE

## 2018-11-16 VITALS
SYSTOLIC BLOOD PRESSURE: 125 MMHG | WEIGHT: 215.6 LBS | HEART RATE: 75 BPM | DIASTOLIC BLOOD PRESSURE: 78 MMHG | BODY MASS INDEX: 29.23 KG/M2

## 2018-11-16 DIAGNOSIS — R19.5 POSITIVE FIT (FECAL IMMUNOCHEMICAL TEST): Primary | ICD-10-CM

## 2018-11-16 PROCEDURE — G8482 FLU IMMUNIZE ORDER/ADMIN: HCPCS | Performed by: INTERNAL MEDICINE

## 2018-11-16 PROCEDURE — 99204 OFFICE O/P NEW MOD 45 MIN: CPT | Performed by: INTERNAL MEDICINE

## 2018-11-16 PROCEDURE — 3017F COLORECTAL CA SCREEN DOC REV: CPT | Performed by: INTERNAL MEDICINE

## 2018-11-16 PROCEDURE — G8427 DOCREV CUR MEDS BY ELIG CLIN: HCPCS | Performed by: INTERNAL MEDICINE

## 2018-11-16 PROCEDURE — 1101F PT FALLS ASSESS-DOCD LE1/YR: CPT | Performed by: INTERNAL MEDICINE

## 2018-11-16 PROCEDURE — G8417 CALC BMI ABV UP PARAM F/U: HCPCS | Performed by: INTERNAL MEDICINE

## 2018-11-16 ASSESSMENT — ENCOUNTER SYMPTOMS
SINUS PRESSURE: 0
VOMITING: 0
NAUSEA: 0
ANAL BLEEDING: 0
TROUBLE SWALLOWING: 0
SORE THROAT: 0
SINUS PAIN: 0
RECTAL PAIN: 0
CHEST TIGHTNESS: 0
ABDOMINAL PAIN: 0
COUGH: 0
ABDOMINAL DISTENTION: 0
DIARRHEA: 0
SHORTNESS OF BREATH: 0
BACK PAIN: 1
CONSTIPATION: 0
BLOOD IN STOOL: 1

## 2018-11-16 NOTE — PROGRESS NOTES
Subjective:      Patient ID: Luis Ball is a 79 y.o. male. Dr. Erica Lopez MD has requested that I see Luis Ball for a consult for   1. Positive FIT (fecal immunochemical test)            Patient seen with a history of positive fecal immunochemical test.  This was identified on routine physical exam.    On further discussion patient denies melena or hematochezia. He does not strain at bowel movements. No diarrhea. No tenderness. Patient denies taking anticoagulation therapy. However he is on one aspirin a day. Has good appetite. No dysphagia. No dyspepsia. No weight loss. 1 month ago patient had left lower quadrant pain for couple of days. At that time the pain appears to be moderate in severity. No fever or chills. No nausea vomiting. No urinary symptoms. No radiation of the pain. Following that 2 days the pain resolved on its own. No recurrent pain. No prior history of similar symptoms. Patient stated that he had a colonoscopy done about 4-5 years ago and at that time no lesions identified. Patient had coronary artery stent placed several years ago. He is on one aspirin a day. Denies chest pain, shortness of breath, palpitations, cough etc.  Has good appetite. No dysphagia or dyspepsia. No weight loss. No prior history of liver disease. He is not an alcoholic. Past Medical, Family, and Social History reviewed and does contribute to the patient presenting condition. patient\"s PMH/PSH,SH,PSYCH hx, MEDs, ALLERGIES, and ROS was all reviewed and updated ion the appropriate sections              1 month       GI Problem   The primary symptoms include arthralgias. Primary symptoms do not include fever, fatigue, abdominal pain, nausea, vomiting or diarrhea. The illness is also significant for back pain. The illness does not include constipation. Review of Systems   Constitutional: Negative for appetite change, fatigue and fever.    HENT: Negative for congestion, sinus pain, sinus pressure, sore throat and trouble swallowing. Eyes: Positive for visual disturbance (glasses). Respiratory: Negative for cough, chest tightness and shortness of breath. Cardiovascular: Negative for chest pain, palpitations and leg swelling. Gastrointestinal: Positive for blood in stool. Negative for abdominal distention, abdominal pain, anal bleeding, constipation, diarrhea, nausea, rectal pain and vomiting. Endocrine: Negative. Genitourinary: Negative for difficulty urinating. Musculoskeletal: Positive for arthralgias, back pain and gait problem. Skin: Negative. Allergic/Immunologic: Positive for environmental allergies (seasonal). Negative for food allergies. Neurological: Negative for dizziness, weakness, light-headedness and headaches. Hematological: Bruises/bleeds easily (bruise). Psychiatric/Behavioral: Negative for sleep disturbance. The patient is not nervous/anxious. Objective:   Physical Exam   Constitutional: He is oriented to person, place, and time. He appears well-developed and well-nourished. No distress. HENT:   Head: Normocephalic and atraumatic. Mouth/Throat: No oropharyngeal exudate. Eyes: Pupils are equal, round, and reactive to light. Conjunctivae are normal. No scleral icterus. Neck: Normal range of motion. Neck supple. No tracheal deviation present. No thyromegaly present. Cardiovascular: Normal rate, regular rhythm, normal heart sounds and intact distal pulses. No murmur heard. Pulmonary/Chest: Effort normal and breath sounds normal. No respiratory distress. He has no wheezes. He has no rales. Abdominal: Soft. Bowel sounds are normal. He exhibits no distension, no ascites and no mass. There is no hepatomegaly. There is no tenderness. There is no guarding. No hernia. No peripheral signs of ch. Liver disease   Genitourinary: Rectum normal.   Musculoskeletal: He exhibits no edema.    Lymphadenopathy:     He has no cervical

## 2018-12-26 ENCOUNTER — OFFICE VISIT (OUTPATIENT)
Dept: FAMILY MEDICINE CLINIC | Age: 68
End: 2018-12-26
Payer: MEDICARE

## 2018-12-26 VITALS
RESPIRATION RATE: 16 BRPM | HEART RATE: 67 BPM | HEIGHT: 72 IN | BODY MASS INDEX: 30.26 KG/M2 | DIASTOLIC BLOOD PRESSURE: 80 MMHG | SYSTOLIC BLOOD PRESSURE: 136 MMHG | OXYGEN SATURATION: 94 % | WEIGHT: 223.4 LBS

## 2018-12-26 DIAGNOSIS — F41.9 ANXIETY: Primary | ICD-10-CM

## 2018-12-26 PROCEDURE — 1123F ACP DISCUSS/DSCN MKR DOCD: CPT | Performed by: FAMILY MEDICINE

## 2018-12-26 PROCEDURE — G8598 ASA/ANTIPLAT THER USED: HCPCS | Performed by: FAMILY MEDICINE

## 2018-12-26 PROCEDURE — 3017F COLORECTAL CA SCREEN DOC REV: CPT | Performed by: FAMILY MEDICINE

## 2018-12-26 PROCEDURE — G8417 CALC BMI ABV UP PARAM F/U: HCPCS | Performed by: FAMILY MEDICINE

## 2018-12-26 PROCEDURE — 1036F TOBACCO NON-USER: CPT | Performed by: FAMILY MEDICINE

## 2018-12-26 PROCEDURE — G8482 FLU IMMUNIZE ORDER/ADMIN: HCPCS | Performed by: FAMILY MEDICINE

## 2018-12-26 PROCEDURE — 4040F PNEUMOC VAC/ADMIN/RCVD: CPT | Performed by: FAMILY MEDICINE

## 2018-12-26 PROCEDURE — G8427 DOCREV CUR MEDS BY ELIG CLIN: HCPCS | Performed by: FAMILY MEDICINE

## 2018-12-26 PROCEDURE — 1101F PT FALLS ASSESS-DOCD LE1/YR: CPT | Performed by: FAMILY MEDICINE

## 2018-12-26 PROCEDURE — 99213 OFFICE O/P EST LOW 20 MIN: CPT | Performed by: FAMILY MEDICINE

## 2018-12-26 RX ORDER — VITAMIN B COMPLEX
1 CAPSULE ORAL DAILY
COMMUNITY

## 2018-12-26 RX ORDER — HYDROXYZINE HYDROCHLORIDE 10 MG/1
10 TABLET, FILM COATED ORAL 2 TIMES DAILY PRN
Qty: 60 TABLET | Refills: 5 | Status: SHIPPED | OUTPATIENT
Start: 2018-12-26 | End: 2019-01-25

## 2018-12-26 RX ORDER — ALPRAZOLAM 0.25 MG/1
0.25 TABLET ORAL DAILY PRN
Qty: 30 TABLET | Refills: 0 | Status: SHIPPED | OUTPATIENT
Start: 2018-12-26 | End: 2019-01-25

## 2018-12-26 ASSESSMENT — ENCOUNTER SYMPTOMS: SHORTNESS OF BREATH: 0

## 2018-12-26 NOTE — PROGRESS NOTES
Visit Information    Have you changed or started any medications since your last visit including any over-the-counter medicines, vitamins, or herbal medicines? no   Are you having any side effects from any of your medications? -  no  Have you stopped taking any of your medications? Is so, why? -  no    Have you seen any other physician or provider since your last visit? No  Have you had any other diagnostic tests since your last visit? No  Have you been seen in the emergency room and/or had an admission to a hospital since we last saw you? No  Have you had your routine dental cleaning in the past 6 months? yes - na    Have you activated your [employer] account? If not, what are your barriers?  Yes     Patient Care Team:  Nitin Miner MD as PCP - Breann Addison MD as Surgeon (Internal Medicine Cardiovascular Disease)  Kwasi Byrd MD as Consulting Physician (Gastroenterology)    Medical History Review  Past Medical, Family, and Social History reviewed and does not contribute to the patient presenting condition    Health Maintenance   Topic Date Due    Shingles Vaccine (1 of 2 - 2 Dose Series) 10/09/2002    DTaP/Tdap/Td vaccine (1 - Tdap) 11/26/2007    Potassium monitoring  11/05/2019    Creatinine monitoring  11/05/2019    Diabetes screen  11/05/2021    Lipid screen  11/05/2023    Colon cancer screen colonoscopy  05/22/2025    Flu vaccine  Completed    Pneumococcal low/med risk  Completed    AAA screen  Completed    Hepatitis C screen  Completed
Disp: , Rfl:     Psyllium (METAMUCIL PO), Take by mouth Indications: 2 tablets twice daily , Disp: , Rfl:     Coenzyme Q10 (COQ10) 100 MG CAPS, Take 100 mg by mouth. One daily , Disp: , Rfl:     Multiple Vitamin (MULTIVITAMIN PO), Take  by mouth. One daily  , Disp: , Rfl:     Omega-3 Fatty Acids (FISH OIL) 1000 MG CPDR, Take 3 tablets by mouth Daily. , Disp: , Rfl:     zoster recombinant adjuvanted vaccine (SHINGRIX) 50 MCG SUSR injection, 50 MCG IM then repeat 2-6 months., Disp: 0.5 mL, Rfl: 1    Subjective:     Review of Systems   Constitutional: Negative for fever. Respiratory: Negative for shortness of breath. Cardiovascular: Negative for chest pain. Psychiatric/Behavioral: Negative for suicidal ideas. The patient is nervous/anxious. Objective:     /80   Pulse 67   Resp 16   Ht 6' 0.01\" (1.829 m)   Wt 223 lb 6.4 oz (101.3 kg)   SpO2 94%   BMI 30.29 kg/m²     Physical Exam   Constitutional: He is oriented to person, place, and time. He appears well-developed. Eyes: Conjunctivae and EOM are normal.   Cardiovascular: Normal heart sounds. No murmur heard. Pulmonary/Chest: Effort normal and breath sounds normal. No respiratory distress. He has no wheezes. Neurological: He is alert and oriented to person, place, and time. Skin: He is not diaphoretic. Psychiatric: He has a normal mood and affect. His behavior is normal. Judgment and thought content normal.       Assessment & Plan:      1. Anxiety  Not interested in daily medication for his anxiety. Not interested in seeing a counselor. Discussed risks of chronic Xanax use, discussed limiting its use. He can try hydroxyzine is not is habit forming. Advised on sedation with either of these medications  - ALPRAZolam (XANAX) 0.25 MG tablet; Take 1 tablet by mouth daily as needed for Anxiety for up to 30 days. .  Dispense: 30 tablet; Refill: 0  - hydrOXYzine (ATARAX) 10 MG tablet;  Take 1 tablet by mouth 2 times daily as needed

## 2019-08-16 ENCOUNTER — TELEPHONE (OUTPATIENT)
Dept: FAMILY MEDICINE CLINIC | Age: 69
End: 2019-08-16

## 2019-08-26 ENCOUNTER — TELEPHONE (OUTPATIENT)
Dept: FAMILY MEDICINE CLINIC | Age: 69
End: 2019-08-26

## 2019-11-25 ENCOUNTER — HOSPITAL ENCOUNTER (OUTPATIENT)
Age: 69
Setting detail: SPECIMEN
Discharge: HOME | End: 2019-11-25
Payer: MEDICARE

## 2019-11-25 ENCOUNTER — OFFICE VISIT (OUTPATIENT)
Dept: FAMILY MEDICINE CLINIC | Age: 69
End: 2019-11-25
Payer: MEDICARE

## 2019-11-25 VITALS
HEART RATE: 68 BPM | DIASTOLIC BLOOD PRESSURE: 87 MMHG | WEIGHT: 219.8 LBS | HEIGHT: 72 IN | TEMPERATURE: 98.1 F | SYSTOLIC BLOOD PRESSURE: 138 MMHG | BODY MASS INDEX: 29.77 KG/M2 | OXYGEN SATURATION: 96 %

## 2019-11-25 DIAGNOSIS — F41.9 ANXIETY: ICD-10-CM

## 2019-11-25 DIAGNOSIS — Z12.5 ENCOUNTER FOR SCREENING FOR MALIGNANT NEOPLASM OF PROSTATE: ICD-10-CM

## 2019-11-25 DIAGNOSIS — J45.40 MODERATE PERSISTENT ASTHMA WITHOUT COMPLICATION: ICD-10-CM

## 2019-11-25 DIAGNOSIS — R41.89 SUBJECTIVE MEMORY COMPLAINTS: ICD-10-CM

## 2019-11-25 DIAGNOSIS — J44.9 COPD MIXED TYPE (HCC): ICD-10-CM

## 2019-11-25 DIAGNOSIS — I10 ESSENTIAL HYPERTENSION: ICD-10-CM

## 2019-11-25 DIAGNOSIS — E78.2 HYPERLIPIDEMIA, MIXED: ICD-10-CM

## 2019-11-25 DIAGNOSIS — I25.119 CORONARY ARTERY DISEASE INVOLVING NATIVE CORONARY ARTERY OF NATIVE HEART WITH ANGINA PECTORIS (HCC): ICD-10-CM

## 2019-11-25 DIAGNOSIS — M62.830 MUSCLE SPASM OF BACK: ICD-10-CM

## 2019-11-25 DIAGNOSIS — Z00.00 ROUTINE GENERAL MEDICAL EXAMINATION AT A HEALTH CARE FACILITY: ICD-10-CM

## 2019-11-25 DIAGNOSIS — Z00.00 ROUTINE GENERAL MEDICAL EXAMINATION AT A HEALTH CARE FACILITY: Primary | ICD-10-CM

## 2019-11-25 DIAGNOSIS — M25.50 ARTHRALGIA OF MULTIPLE JOINTS: ICD-10-CM

## 2019-11-25 DIAGNOSIS — Z71.89 ADVANCE CARE PLANNING: ICD-10-CM

## 2019-11-25 LAB
ABSOLUTE EOS #: 0.3 K/UL (ref 0–0.44)
ABSOLUTE IMMATURE GRANULOCYTE: 0.03 K/UL (ref 0–0.3)
ABSOLUTE LYMPH #: 1.34 K/UL (ref 1.1–3.7)
ABSOLUTE MONO #: 0.83 K/UL (ref 0.1–1.2)
BASOPHILS # BLD: 1 % (ref 0–2)
BASOPHILS ABSOLUTE: 0.04 K/UL (ref 0–0.2)
CHOLESTEROL/HDL RATIO: 2.5
CHOLESTEROL: 146 MG/DL
DIFFERENTIAL TYPE: ABNORMAL
EOSINOPHILS RELATIVE PERCENT: 6 % (ref 1–4)
ESTIMATED AVERAGE GLUCOSE: 131 MG/DL
HBA1C MFR BLD: 6.2 % (ref 4–6)
HCT VFR BLD CALC: 47.1 % (ref 40.7–50.3)
HDLC SERPL-MCNC: 59 MG/DL
HEMOGLOBIN: 15.7 G/DL (ref 13–17)
IMMATURE GRANULOCYTES: 1 %
LDL CHOLESTEROL: 70 MG/DL (ref 0–130)
LYMPHOCYTES # BLD: 29 % (ref 24–43)
MCH RBC QN AUTO: 31.5 PG (ref 25.2–33.5)
MCHC RBC AUTO-ENTMCNC: 33.3 G/DL (ref 28.4–34.8)
MCV RBC AUTO: 94.4 FL (ref 82.6–102.9)
MONOCYTES # BLD: 18 % (ref 3–12)
NRBC AUTOMATED: 0 PER 100 WBC
PDW BLD-RTO: 14.4 % (ref 11.8–14.4)
PLATELET # BLD: 282 K/UL (ref 138–453)
PLATELET ESTIMATE: ABNORMAL
PMV BLD AUTO: 10.8 FL (ref 8.1–13.5)
PROSTATE SPECIFIC ANTIGEN: 1.23 UG/L
RBC # BLD: 4.99 M/UL (ref 4.21–5.77)
RBC # BLD: ABNORMAL 10*6/UL
SEG NEUTROPHILS: 45 % (ref 36–65)
SEGMENTED NEUTROPHILS ABSOLUTE COUNT: 2.16 K/UL (ref 1.5–8.1)
TRIGL SERPL-MCNC: 83 MG/DL
VLDLC SERPL CALC-MCNC: NORMAL MG/DL (ref 1–30)
WBC # BLD: 4.7 K/UL (ref 3.5–11.3)
WBC # BLD: ABNORMAL 10*3/UL

## 2019-11-25 PROCEDURE — 3017F COLORECTAL CA SCREEN DOC REV: CPT | Performed by: FAMILY MEDICINE

## 2019-11-25 PROCEDURE — G8598 ASA/ANTIPLAT THER USED: HCPCS | Performed by: FAMILY MEDICINE

## 2019-11-25 PROCEDURE — G8482 FLU IMMUNIZE ORDER/ADMIN: HCPCS | Performed by: FAMILY MEDICINE

## 2019-11-25 PROCEDURE — G0439 PPPS, SUBSEQ VISIT: HCPCS | Performed by: FAMILY MEDICINE

## 2019-11-25 PROCEDURE — 1123F ACP DISCUSS/DSCN MKR DOCD: CPT | Performed by: FAMILY MEDICINE

## 2019-11-25 PROCEDURE — 4040F PNEUMOC VAC/ADMIN/RCVD: CPT | Performed by: FAMILY MEDICINE

## 2019-11-25 RX ORDER — CELECOXIB 100 MG/1
100 CAPSULE ORAL DAILY PRN
Qty: 90 CAPSULE | Refills: 2 | Status: SHIPPED | OUTPATIENT
Start: 2019-11-25 | End: 2021-01-07

## 2019-11-25 RX ORDER — METHOCARBAMOL 500 MG/1
500 TABLET, FILM COATED ORAL EVERY 6 HOURS PRN
Qty: 90 TABLET | Refills: 3 | Status: SHIPPED | OUTPATIENT
Start: 2019-11-25

## 2019-11-25 RX ORDER — LISINOPRIL 10 MG/1
10 TABLET ORAL 2 TIMES DAILY
Qty: 1 TABLET | Refills: 0
Start: 2019-11-25 | End: 2021-01-07

## 2019-11-25 RX ORDER — ALBUTEROL SULFATE 90 UG/1
2 AEROSOL, METERED RESPIRATORY (INHALATION) EVERY 6 HOURS PRN
Qty: 3 INHALER | Refills: 1 | Status: SHIPPED | OUTPATIENT
Start: 2019-11-25

## 2019-11-25 RX ORDER — DILTIAZEM HYDROCHLORIDE 120 MG/1
120 CAPSULE, EXTENDED RELEASE ORAL DAILY
Qty: 1 CAPSULE | Refills: 0
Start: 2019-11-25

## 2019-11-25 RX ORDER — ROSUVASTATIN CALCIUM 20 MG/1
20 TABLET, COATED ORAL NIGHTLY
Qty: 90 TABLET | Refills: 3 | Status: SHIPPED | OUTPATIENT
Start: 2019-11-25

## 2019-11-25 RX ORDER — BUDESONIDE AND FORMOTEROL FUMARATE DIHYDRATE 160; 4.5 UG/1; UG/1
2 AEROSOL RESPIRATORY (INHALATION) 2 TIMES DAILY
Qty: 3 INHALER | Refills: 3 | Status: SHIPPED | OUTPATIENT
Start: 2019-11-25 | End: 2020-04-22

## 2019-11-25 RX ORDER — BUDESONIDE AND FORMOTEROL FUMARATE DIHYDRATE 160; 4.5 UG/1; UG/1
2 AEROSOL RESPIRATORY (INHALATION) 2 TIMES DAILY
Qty: 1 INHALER | Refills: 11 | Status: SHIPPED | OUTPATIENT
Start: 2019-11-25 | End: 2019-11-25 | Stop reason: SDUPTHER

## 2019-11-25 ASSESSMENT — PATIENT HEALTH QUESTIONNAIRE - PHQ9
SUM OF ALL RESPONSES TO PHQ QUESTIONS 1-9: 0
SUM OF ALL RESPONSES TO PHQ QUESTIONS 1-9: 0

## 2019-11-25 ASSESSMENT — LIFESTYLE VARIABLES
HOW OFTEN DO YOU HAVE A DRINK CONTAINING ALCOHOL: 3
HOW MANY STANDARD DRINKS CONTAINING ALCOHOL DO YOU HAVE ON A TYPICAL DAY: 0

## 2019-12-02 ENCOUNTER — CLINICAL DOCUMENTATION (OUTPATIENT)
Dept: SPIRITUAL SERVICES | Age: 69
End: 2019-12-02

## 2019-12-09 ENCOUNTER — CLINICAL DOCUMENTATION (OUTPATIENT)
Dept: SPIRITUAL SERVICES | Age: 69
End: 2019-12-09

## 2020-01-22 ENCOUNTER — CLINICAL DOCUMENTATION (OUTPATIENT)
Dept: SPIRITUAL SERVICES | Age: 70
End: 2020-01-22

## 2020-01-22 NOTE — PROGRESS NOTES
Writer meets with patient and his wife, Milka Verduzco for [facility]. Discussion of goals and values around healthcare and treatments. Patient's questions answered about documents, etc. Writer assists patient with completion of Durable Power of  for healthcare an Living Will. Original and one copy given to patient. One copy will be taken to Medical Records to be scanned into EMR.

## 2020-02-11 ENCOUNTER — OFFICE VISIT (OUTPATIENT)
Dept: NEUROLOGY | Age: 70
End: 2020-02-11
Payer: MEDICARE

## 2020-02-11 VITALS
HEIGHT: 72 IN | BODY MASS INDEX: 29.39 KG/M2 | DIASTOLIC BLOOD PRESSURE: 89 MMHG | WEIGHT: 217 LBS | SYSTOLIC BLOOD PRESSURE: 161 MMHG

## 2020-02-11 PROCEDURE — 1123F ACP DISCUSS/DSCN MKR DOCD: CPT | Performed by: PSYCHIATRY & NEUROLOGY

## 2020-02-11 PROCEDURE — 1036F TOBACCO NON-USER: CPT | Performed by: PSYCHIATRY & NEUROLOGY

## 2020-02-11 PROCEDURE — 3017F COLORECTAL CA SCREEN DOC REV: CPT | Performed by: PSYCHIATRY & NEUROLOGY

## 2020-02-11 PROCEDURE — G8482 FLU IMMUNIZE ORDER/ADMIN: HCPCS | Performed by: PSYCHIATRY & NEUROLOGY

## 2020-02-11 PROCEDURE — 99204 OFFICE O/P NEW MOD 45 MIN: CPT | Performed by: PSYCHIATRY & NEUROLOGY

## 2020-02-11 PROCEDURE — G8417 CALC BMI ABV UP PARAM F/U: HCPCS | Performed by: PSYCHIATRY & NEUROLOGY

## 2020-02-11 PROCEDURE — G8427 DOCREV CUR MEDS BY ELIG CLIN: HCPCS | Performed by: PSYCHIATRY & NEUROLOGY

## 2020-02-11 PROCEDURE — 4040F PNEUMOC VAC/ADMIN/RCVD: CPT | Performed by: PSYCHIATRY & NEUROLOGY

## 2020-02-11 RX ORDER — TRIAMTERENE AND HYDROCHLOROTHIAZIDE 37.5; 25 MG/1; MG/1
CAPSULE ORAL
COMMUNITY
Start: 2020-01-19 | End: 2021-01-07

## 2020-02-11 NOTE — PROGRESS NOTES
REVIEW OF SYSTEMS    Constitutional Weight: absent, Appetite: absent, Fatigue: absent   HEENT Visual disturbance: absent, Ears: diminished   Respiratory Shortness of breath: absent, Cough: absent   Cardiovascular Chest pain: absent, Leg swelling :absent   GI Constipation: absent, Diarrhea: absent, Swallowing change: absent    Urinary frequency: absent, Urinary urgency: absent,    Musculoskeletal Neck pain: absent, Back pain: absent, Stiffness: absent, Muscle pain: absent, Joint pain: absent   Dermatological Hair loss: absent, Skin changes: absent   Neurological Memory loss: present, Confusion: absent, Seizures: absent, Trouble walking or imbalance: absent, Dizziness: absent, Weakness: absent, Numbness: absent Tremor: absent, Spasm: absent, Speech difficulty: absent, Headache: absent, Light sensitivity: absent   Psychiatric Anxiety: absent, Hallucination: absent, Depression, absent   Hematologic Abnormal bleeding/Bruising: absent, Anemia: absent

## 2020-02-11 NOTE — PROGRESS NOTES
absent, Joint pain: absent   Dermatological Hair loss: absent, Skin changes: absent   Neurological Memory loss: present, Confusion: absent, Seizures: absent, Trouble walking or imbalance: absent, Dizziness: absent, Weakness: absent, Numbness: absent Tremor: absent, Spasm: absent, Speech difficulty: absent, Headache: absent, Light sensitivity: absent   Psychiatric Anxiety: absent, Hallucination: absent, Depression, absent   Hematologic Abnormal bleeding/Bruising: absent, Anemia: absent       Maximum score 5 Score 5 What is the year, season, date, day, month   Maximum score 5 Score 5 Where are we: State, county, town, hospital, floor? Maximum score 3 Score 3 Name 3 objects: ball, pen, car. Ask patient to repeat 3 objects. Maximum score 5 Score 5 Serial sevens from 100:93, 86, 79, 72, 65   Maximum score 3 Score 3 Recall 3 objects   Maximum score 2 Score 2 Name a pencil and watch. Maximum score 1 Score 1 Repeat the following: No ifs ands or buts.      Maximum score 3 Score 3 Follow 3 stage command take a paper in your hand, fold it in half, and put it on the floor. Maximum score 1  Score 1 Read and obey the following: Close your eyes     Maximum score 1 Score 1 Write a sentence. Maximum score 1 Score 1 Copy a design below.        Max 30   Patients score 30          Neurological work up:  CT head  CTA head and neck  MRI brain   2 D echo     Past Medical History:   Diagnosis Date    Arthritis     Asthma     [facility]'s [facility]y     [facility]    Bell's palsy 2014    Chicken pox     Chronic back pain     Chronic kidney disease     COPD (chronic obstructive pulmonary disease) (HCC)     Diverticular disease     Environmental allergies     Heart disease     has stent    Hyperlipidemia     Measles     Mumps      Past Surgical History:   Procedure Laterality Date    COLONOSCOPY  05/2015    Dr Fredo Maria,  normal, recheck 10 years    [facility] 24 2009    FRACTURE SURGERY Left 11/2011    hand metacarple, in [facility] 83  2098    umbilical    NECK SURGERY  2000    benign lump    TONSILLECTOMY  age 12    VASECTOMY       Allergies   Allergen Reactions    Amlodipine Swelling     Leg Swelling    Avapro [Irbesartan]     Lipitor Other (See Comments)     insomnia    Norvasc [Amlodipine Besylate] Swelling     Family History   Problem Relation Age of Onset    High Blood Pressure Mother     High Cholesterol Mother     Alzheimer's Disease Mother     Stroke Mother     High Cholesterol Father     High Blood Pressure Father       Social History     Socioeconomic History    Marital status:      Spouse name: Not on file    Number of children: Not on file    Years of education: Not on file    Highest education level: Not on file   Occupational History    Not on file   Social Needs    Financial resource strain: Not on file    Food insecurity:     Worry: Not on file     Inability: Not on file    Transportation needs:     Medical: Not on file     Non-medical: Not on file   Tobacco Use    Smoking status: Former Smoker     Packs/day: 0.50     Years: 20.00     Pack years: 10.00     Types: Cigarettes     Last attempt to quit: 10/25/2004     Years since quitting: 15.3    Smokeless tobacco: Never Used   Substance and Sexual Activity    Alcohol use:  Yes     Alcohol/week: 4.0 - 7.0 standard drinks     Types: 3 Cans of beer, 1 - 4 Standard drinks or equivalent per week     Comment: beer    Drug use: No    Sexual activity: Yes     Partners: Female     Comment: spouse   Lifestyle    Physical activity:     Days per week: Not on file     Minutes per session: Not on file    Stress: Not on file   Relationships    Social connections:     Talks on phone: Not on file     Gets together: Not on file     Attends [employer] service: Not on file     Active member of club or organization: Not on file     Attends meetings of clubs or organizations: Not on proprioception     Cerebellar Intact fine motor movement. No involuntary movements or tremors     Reflex function Intact 2+ DTR and symmetric. Negative Babinski     Gait                  Normal station and gait       Lab Results   Component Value Date    LDLCHOLESTEROL 70 11/25/2019     No components found for: CHLPL  Lab Results   Component Value Date    TRIG 83 11/25/2019    TRIG 128 11/05/2018    TRIG 134 12/21/2017     Lab Results   Component Value Date    HDL 59 11/25/2019    HDL 64 11/05/2018    HDL 70 12/21/2017     No results found for: LDLCALC  No results found for: LABVLDL  Lab Results   Component Value Date    LABA1C 6.2 (H) 11/25/2019     Lab Results   Component Value Date     11/25/2019     No results found for: Thierno Gomez     All of patient's labs were personally reviewed. All the imaging studies were personally reviewed and discussed with the patient. Assessment Recommendations: The patient has 1 year history of progressive word finding difficulty. Differential diagnosis includes primary progressive aphasia    I would obtain MRI scan of the brain to rule out any structural lesion in the left frontal cortex. Based on MRI scan, will consider getting a PET scan    I will check vitamin B12 level, TSH, free T4 to rule other causes with symptoms. I will also obtain a formal neuropsychological assessment    Patient to follow-up with me next 3 months, sooner based on the imaging and lab testing. Freddie Benjamin MD I would like to thank you for the consult. Please do not hesitate if you have any questions about the patient care. Scribe Attestation:   By signing my name below, Tenisha Lawler, attest that this documentation has been prepared under the direction and in the presence of Anneliese Trevino MD.     Electronically Signed: Hector Hester. 2/11/20. 4:02 PM.     Physician Attestation:   Tone Woodward MD, personally performed the services described in this documentation.  All

## 2020-02-13 ENCOUNTER — TELEPHONE (OUTPATIENT)
Dept: NEUROLOGY | Age: 70
End: 2020-02-13

## 2020-05-26 ENCOUNTER — HOSPITAL ENCOUNTER (OUTPATIENT)
Age: 70
Discharge: HOME | End: 2020-05-26
Payer: MEDICARE

## 2020-05-26 ENCOUNTER — HOSPITAL ENCOUNTER (OUTPATIENT)
Dept: MRI IMAGING | Age: 70
Discharge: HOME | End: 2020-05-28
Payer: MEDICARE

## 2020-05-26 LAB
CREAT SERPL-MCNC: 1.19 MG/DL (ref 0.7–1.2)
GFR AFRICAN AMERICAN: >60 ML/MIN
GFR NON-AFRICAN AMERICAN: >60 ML/MIN
GFR SERPL CREATININE-BSD FRML MDRD: NORMAL ML/MIN/{1.73_M2}
GFR SERPL CREATININE-BSD FRML MDRD: NORMAL ML/MIN/{1.73_M2}
THYROXINE, FREE: 1.02 NG/DL (ref 0.93–1.7)
TSH SERPL DL<=0.05 MIU/L-ACNC: 6.84 MIU/L (ref 0.3–5)
VITAMIN B-12: >2000 PG/ML (ref 232–1245)

## 2020-05-26 PROCEDURE — 82607 VITAMIN B-12: CPT

## 2020-05-26 PROCEDURE — 6360000004 HC RX CONTRAST MEDICATION: Performed by: PSYCHIATRY & NEUROLOGY

## 2020-05-26 PROCEDURE — A9579 GAD-BASE MR CONTRAST NOS,1ML: HCPCS | Performed by: PSYCHIATRY & NEUROLOGY

## 2020-05-26 PROCEDURE — 70553 MRI BRAIN STEM W/O & W/DYE: CPT

## 2020-05-26 PROCEDURE — 82565 ASSAY OF CREATININE: CPT

## 2020-05-26 PROCEDURE — 36415 COLL VENOUS BLD VENIPUNCTURE: CPT

## 2020-05-26 PROCEDURE — 84443 ASSAY THYROID STIM HORMONE: CPT

## 2020-05-26 PROCEDURE — 84439 ASSAY OF FREE THYROXINE: CPT

## 2020-05-26 RX ADMIN — GADOTERIDOL 20 ML: 279.3 INJECTION, SOLUTION INTRAVENOUS at 09:55

## 2020-07-29 ENCOUNTER — OFFICE VISIT (OUTPATIENT)
Dept: NEUROLOGY | Age: 70
End: 2020-07-29
Payer: MEDICARE

## 2020-07-29 VITALS
BODY MASS INDEX: 28.44 KG/M2 | TEMPERATURE: 98.1 F | WEIGHT: 210 LBS | SYSTOLIC BLOOD PRESSURE: 165 MMHG | HEART RATE: 63 BPM | DIASTOLIC BLOOD PRESSURE: 91 MMHG | HEIGHT: 72 IN

## 2020-07-29 PROCEDURE — 1123F ACP DISCUSS/DSCN MKR DOCD: CPT | Performed by: PSYCHIATRY & NEUROLOGY

## 2020-07-29 PROCEDURE — 1036F TOBACCO NON-USER: CPT | Performed by: PSYCHIATRY & NEUROLOGY

## 2020-07-29 PROCEDURE — G8417 CALC BMI ABV UP PARAM F/U: HCPCS | Performed by: PSYCHIATRY & NEUROLOGY

## 2020-07-29 PROCEDURE — 3017F COLORECTAL CA SCREEN DOC REV: CPT | Performed by: PSYCHIATRY & NEUROLOGY

## 2020-07-29 PROCEDURE — 99214 OFFICE O/P EST MOD 30 MIN: CPT | Performed by: PSYCHIATRY & NEUROLOGY

## 2020-07-29 PROCEDURE — 4040F PNEUMOC VAC/ADMIN/RCVD: CPT | Performed by: PSYCHIATRY & NEUROLOGY

## 2020-07-29 PROCEDURE — G8427 DOCREV CUR MEDS BY ELIG CLIN: HCPCS | Performed by: PSYCHIATRY & NEUROLOGY

## 2020-07-29 NOTE — PROGRESS NOTES
[facility], [address], [address] [facility]  Ph: [phone] or [phone]  FAX: [phone]    Chief Complaint: Cognitive impairment     Dear Margarito Mcdowell MD     I had the pleasure of seeing your patient today in neurology consultation for his symptoms. As you would recall Carlito Corona is a 81 y.o. male. The history has been obtained from the patient and from the accompanying medical records. The patient reports being at his baseline until almost 1 year ago and for last 1 year, he has been noticing having difficulty finding words and difficulty naming objects. The symptoms are more or less persistent and are more noticeable in the last few months. Otherwise he denies having any cognitive impairment. He lives with his wife, whom he reports has noticed some memory changes. He is an avid reader and is able to keep up with the reading skills. His mother passed away from Alzheimer's disease in her late [de-identified]. Today he reports his symptoms have remained at baseline without worsening. He continues to have difficulty finding words. Of note, he is retired and has a history of left sided Bell's Palsy.  He is a former smoker                                REVIEW OF SYSTEMS     Constitutional Weight: absent, Appetite: absent, Fatigue: absent   HEENT Visual disturbance: absent, Ears: normal   Respiratory Shortness of breath: absent, Cough: absent   Cardiovascular Chest pain: absent, Leg swelling :absent   GI Constipation: absent, Diarrhea: absent, Swallowing change: absent    Urinary frequency: absent, Urinary urgency: absent,    Musculoskeletal Neck pain: absent, Back pain: absent, Stiffness: absent, Muscle pain: absent, Joint pain: absent   Dermatological Hair loss: absent, Skin changes: present   Neurological Memory loss: present, Confusion: present, Seizures: absent, Trouble walking or imbalance: absent, Dizziness: absent, Weakness: absent, Numbness: absent Tremor: absent, Spasm: absent, Speech difficulty: absent, Headache: absent, Light sensitivity: absent   Psychiatric Anxiety: absent, Hallucination: absent, Depression, absent   Hematologic Abnormal bleeding/Bruising: absent, Anemia: absent       Maximum score 5 Score 5-0 What is the year, season, date, day, month   Maximum score 5 Score 5 Where are we: State, county, town, hospital, floor? Maximum score 3 Score 3-0 Name 3 objects: ball, pen, car. Ask patient to repeat 3 objects. Maximum score 5 Score 5-0 Serial sevens from 100:93, 86, 79, 72, 65   Maximum score 3 Score 3-0 Recall 3 objects   Maximum score 2 Score 2 Name a pencil and watch. Maximum score 1 Score 1 Repeat the following: No ifs ands or buts.      Maximum score 3 Score 3 Follow 3 stage command take a paper in your hand, fold it in half, and put it on the floor. Maximum score 1  Score 1 Read and obey the following: Close your eyes     Maximum score 1 Score 1 Write a sentence. Maximum score 1 Score 1 Copy a design below. Max 30   Patients score 30        Neurological work up:  CT head  CTA head and neck  Neuropsych testing normal  MRI brain 5/26/20 No evidence of an acute infarct or intracranial mass. Trace chronic microvascular white matter ischemic disease. No other findings to account for the patient's progressive aphasia and memory loss.    Labs 5/26/20 creatinine, T4 normal. B12 >2000, TSH 6.84  2 D echo     Past Medical History:   Diagnosis Date    Arthritis     Asthma     [facility]     [facility]    Bell's palsy 2014    Chicken pox     Chronic back pain     Chronic kidney disease     COPD (chronic obstructive pulmonary disease) (HCC)     Diverticular disease     Environmental allergies     Heart disease     has stent    Hyperlipidemia     Measles     Mumps      Past Surgical History:   Procedure Laterality Date    COLONOSCOPY  05/2015    Dr Jagjit Tamayo,  normal, recheck 10 years     CORONARY ANGIOPLASTY WITH STENT PLACEMENT  2009    FRACTURE SURGERY Left 11/2011    hand metacarple, in [facility] 83  4254    umbilical    NECK SURGERY  2000    benign lump    TONSILLECTOMY  age 12    VASECTOMY       Allergies   Allergen Reactions    Amlodipine Swelling     Leg Swelling    Avapro [Irbesartan]     Lipitor Other (See Comments)     insomnia    Norvasc [Amlodipine Besylate] Swelling     Family History   Problem Relation Age of Onset    High Blood Pressure Mother     High Cholesterol Mother     Alzheimer's Disease Mother     Stroke Mother     High Cholesterol Father     High Blood Pressure Father       Social History     Socioeconomic History    Marital status:      Spouse name: Not on file    Number of children: Not on file    Years of education: Not on file    Highest education level: Not on file   Occupational History    Not on file   Social Needs    Financial resource strain: Not on file    Food insecurity     Worry: Not on file     Inability: Not on file    Transportation needs     Medical: Not on file     Non-medical: Not on file   Tobacco Use    Smoking status: Former Smoker     Packs/day: 0.50     Years: 20.00     Pack years: 10.00     Types: Cigarettes     Last attempt to quit: 10/25/2004     Years since quitting: 15.7    Smokeless tobacco: Never Used   Substance and Sexual Activity    Alcohol use:  Yes     Alcohol/week: 4.0 - 7.0 standard drinks     Types: 3 Cans of beer, 1 - 4 Standard drinks or equivalent per week     Comment: beer    Drug use: No    Sexual activity: Yes     Partners: Female     Comment: spouse   Lifestyle    Physical activity     Days per week: Not on file     Minutes per session: Not on file    Stress: Not on file   Relationships    Social connections     Talks on phone: Not on file     Gets together: Not on file     Attends [employer] service: Not on file     Active member of club or organization: Not on file     Attends the patient care. Scribe Attestation:   By signing my name below, Magdalene Pool, attest that this documentation has been prepared under the direction and in the presence of Flori Tyler MD.     Electronically Signed: David Chandler. 7/29/20. 9:35 AM EDT. Physician Attestation:   Hal Alves MD, personally performed the services described in this documentation. All medical record entries made by the scribe were at my direction and in my presence. I have reviewed the chart and discharge instructions (if applicable) and agree that the record reflects my personal performance and is accurate and complete.     Electronically Signed: America Medina 7/31/2020 10:17 PM    Diplomate, American Board of Psychiatry and Neurology  Diplomate, American Board of Clinical Neurophysiology  Diplomate, American Board of Epilepsy

## 2020-07-29 NOTE — PROGRESS NOTES
REVIEW OF SYSTEMS    Constitutional Weight: absent, Appetite: absent, Fatigue: absent   HEENT Visual disturbance: absent, Ears: normal   Respiratory Shortness of breath: absent, Cough: absent   Cardiovascular Chest pain: absent, Leg swelling :absent   GI Constipation: absent, Diarrhea: absent, Swallowing change: absent    Urinary frequency: absent, Urinary urgency: absent,    Musculoskeletal Neck pain: absent, Back pain: absent, Stiffness: absent, Muscle pain: absent, Joint pain: absent   Dermatological Hair loss: absent, Skin changes: present   Neurological Memory loss: present, Confusion: present, Seizures: absent, Trouble walking or imbalance: absent, Dizziness: absent, Weakness: absent, Numbness: absent Tremor: absent, Spasm: absent, Speech difficulty: absent, Headache: absent, Light sensitivity: absent   Psychiatric Anxiety: absent, Hallucination: absent, Depression, absent   Hematologic Abnormal bleeding/Bruising: absent, Anemia: absent

## 2020-11-23 ENCOUNTER — HOSPITAL ENCOUNTER (OUTPATIENT)
Age: 70
Setting detail: SPECIMEN
Discharge: HOME | End: 2020-11-23
Payer: MEDICARE

## 2020-11-23 LAB
ABSOLUTE EOS #: 0.14 K/UL (ref 0–0.44)
ABSOLUTE IMMATURE GRANULOCYTE: <0.03 K/UL (ref 0–0.3)
ABSOLUTE LYMPH #: 1.34 K/UL (ref 1.1–3.7)
ABSOLUTE MONO #: 0.86 K/UL (ref 0.1–1.2)
ALBUMIN SERPL-MCNC: 4 G/DL (ref 3.5–5.2)
ALBUMIN/GLOBULIN RATIO: 1.5 (ref 1–2.5)
ALP BLD-CCNC: 46 U/L (ref 40–129)
ALT SERPL-CCNC: 26 U/L (ref 5–41)
ANION GAP SERPL CALCULATED.3IONS-SCNC: 12 MMOL/L (ref 9–17)
AST SERPL-CCNC: 32 U/L
BASOPHILS # BLD: 1 % (ref 0–2)
BASOPHILS ABSOLUTE: 0.04 K/UL (ref 0–0.2)
BILIRUB SERPL-MCNC: 0.51 MG/DL (ref 0.3–1.2)
BILIRUBIN DIRECT: 0.12 MG/DL
BILIRUBIN, INDIRECT: 0.39 MG/DL (ref 0–1)
BUN BLDV-MCNC: 17 MG/DL (ref 8–23)
BUN/CREAT BLD: ABNORMAL (ref 9–20)
CALCIUM SERPL-MCNC: 9.3 MG/DL (ref 8.6–10.4)
CHLORIDE BLD-SCNC: 104 MMOL/L (ref 98–107)
CHOLESTEROL/HDL RATIO: 3
CHOLESTEROL: 173 MG/DL
CO2: 25 MMOL/L (ref 20–31)
CREAT SERPL-MCNC: 1.01 MG/DL (ref 0.7–1.2)
DIFFERENTIAL TYPE: ABNORMAL
EOSINOPHILS RELATIVE PERCENT: 2 % (ref 1–4)
GFR AFRICAN AMERICAN: >60 ML/MIN
GFR NON-AFRICAN AMERICAN: >60 ML/MIN
GFR SERPL CREATININE-BSD FRML MDRD: ABNORMAL ML/MIN/{1.73_M2}
GFR SERPL CREATININE-BSD FRML MDRD: ABNORMAL ML/MIN/{1.73_M2}
GLOBULIN: NORMAL G/DL (ref 1.5–3.8)
GLUCOSE BLD-MCNC: 103 MG/DL (ref 70–99)
HCT VFR BLD CALC: 51.7 % (ref 40.7–50.3)
HDLC SERPL-MCNC: 58 MG/DL
HEMOGLOBIN: 16.5 G/DL (ref 13–17)
IMMATURE GRANULOCYTES: 0 %
LDL CHOLESTEROL: 86 MG/DL (ref 0–130)
LYMPHOCYTES # BLD: 23 % (ref 24–43)
MCH RBC QN AUTO: 31.2 PG (ref 25.2–33.5)
MCHC RBC AUTO-ENTMCNC: 31.9 G/DL (ref 28.4–34.8)
MCV RBC AUTO: 97.7 FL (ref 82.6–102.9)
MONOCYTES # BLD: 15 % (ref 3–12)
NRBC AUTOMATED: 0 PER 100 WBC
PDW BLD-RTO: 14.7 % (ref 11.8–14.4)
PLATELET # BLD: 220 K/UL (ref 138–453)
PLATELET ESTIMATE: ABNORMAL
PMV BLD AUTO: 11.5 FL (ref 8.1–13.5)
POTASSIUM SERPL-SCNC: 4.7 MMOL/L (ref 3.7–5.3)
RBC # BLD: 5.29 M/UL (ref 4.21–5.77)
RBC # BLD: ABNORMAL 10*6/UL
SEG NEUTROPHILS: 59 % (ref 36–65)
SEGMENTED NEUTROPHILS ABSOLUTE COUNT: 3.52 K/UL (ref 1.5–8.1)
SODIUM BLD-SCNC: 141 MMOL/L (ref 135–144)
TOTAL PROTEIN: 6.7 G/DL (ref 6.4–8.3)
TRIGL SERPL-MCNC: 145 MG/DL
VLDLC SERPL CALC-MCNC: NORMAL MG/DL (ref 1–30)
WBC # BLD: 5.9 K/UL (ref 3.5–11.3)
WBC # BLD: ABNORMAL 10*3/UL

## 2021-01-07 ENCOUNTER — OFFICE VISIT (OUTPATIENT)
Dept: FAMILY MEDICINE CLINIC | Age: 71
End: 2021-01-07
Payer: MEDICARE

## 2021-01-07 VITALS
OXYGEN SATURATION: 98 % | WEIGHT: 215 LBS | HEIGHT: 72 IN | TEMPERATURE: 96.9 F | HEART RATE: 60 BPM | SYSTOLIC BLOOD PRESSURE: 150 MMHG | DIASTOLIC BLOOD PRESSURE: 82 MMHG | BODY MASS INDEX: 29.12 KG/M2 | RESPIRATION RATE: 18 BRPM

## 2021-01-07 DIAGNOSIS — I25.119 CORONARY ARTERY DISEASE INVOLVING NATIVE CORONARY ARTERY OF NATIVE HEART WITH ANGINA PECTORIS (HCC): Primary | ICD-10-CM

## 2021-01-07 DIAGNOSIS — I10 ESSENTIAL HYPERTENSION: ICD-10-CM

## 2021-01-07 DIAGNOSIS — N52.9 ERECTILE DYSFUNCTION, UNSPECIFIED ERECTILE DYSFUNCTION TYPE: ICD-10-CM

## 2021-01-07 DIAGNOSIS — J44.9 COPD MIXED TYPE (HCC): ICD-10-CM

## 2021-01-07 DIAGNOSIS — Z12.5 ENCOUNTER FOR SCREENING FOR MALIGNANT NEOPLASM OF PROSTATE: ICD-10-CM

## 2021-01-07 PROCEDURE — 1036F TOBACCO NON-USER: CPT | Performed by: INTERNAL MEDICINE

## 2021-01-07 PROCEDURE — G8417 CALC BMI ABV UP PARAM F/U: HCPCS | Performed by: INTERNAL MEDICINE

## 2021-01-07 PROCEDURE — 3017F COLORECTAL CA SCREEN DOC REV: CPT | Performed by: INTERNAL MEDICINE

## 2021-01-07 PROCEDURE — G8427 DOCREV CUR MEDS BY ELIG CLIN: HCPCS | Performed by: INTERNAL MEDICINE

## 2021-01-07 PROCEDURE — 4040F PNEUMOC VAC/ADMIN/RCVD: CPT | Performed by: INTERNAL MEDICINE

## 2021-01-07 PROCEDURE — G8926 SPIRO NO PERF OR DOC: HCPCS | Performed by: INTERNAL MEDICINE

## 2021-01-07 PROCEDURE — G8484 FLU IMMUNIZE NO ADMIN: HCPCS | Performed by: INTERNAL MEDICINE

## 2021-01-07 PROCEDURE — 1123F ACP DISCUSS/DSCN MKR DOCD: CPT | Performed by: INTERNAL MEDICINE

## 2021-01-07 PROCEDURE — 99214 OFFICE O/P EST MOD 30 MIN: CPT | Performed by: INTERNAL MEDICINE

## 2021-01-07 PROCEDURE — 3023F SPIROM DOC REV: CPT | Performed by: INTERNAL MEDICINE

## 2021-01-07 RX ORDER — LISINOPRIL 20 MG/1
TABLET ORAL
COMMUNITY
Start: 2020-12-18 | End: 2021-01-07 | Stop reason: ALTCHOICE

## 2021-01-07 RX ORDER — SILDENAFIL CITRATE 20 MG/1
20 TABLET ORAL DAILY PRN
Qty: 9 TABLET | Refills: 5 | Status: SHIPPED | OUTPATIENT
Start: 2021-01-07

## 2021-01-07 RX ORDER — LISINOPRIL 30 MG/1
TABLET ORAL
Qty: 60 TABLET | Refills: 5 | Status: SHIPPED | OUTPATIENT
Start: 2021-01-07 | End: 2021-07-13 | Stop reason: SDUPTHER

## 2021-01-07 ASSESSMENT — ENCOUNTER SYMPTOMS
ORTHOPNEA: 0
NAUSEA: 0
VOICE CHANGE: 0
BLOOD IN STOOL: 0
STRIDOR: 0
SHORTNESS OF BREATH: 0
ABDOMINAL PAIN: 0
RECTAL PAIN: 0
CONSTIPATION: 0
VOMITING: 0
DIARRHEA: 0
CHEST TIGHTNESS: 0
CHOKING: 0
COLOR CHANGE: 0
APNEA: 1
COUGH: 0
WHEEZING: 0
TROUBLE SWALLOWING: 0

## 2021-01-07 ASSESSMENT — PATIENT HEALTH QUESTIONNAIRE - PHQ9
SUM OF ALL RESPONSES TO PHQ QUESTIONS 1-9: 0
2. FEELING DOWN, DEPRESSED OR HOPELESS: 0
1. LITTLE INTEREST OR PLEASURE IN DOING THINGS: 0
SUM OF ALL RESPONSES TO PHQ QUESTIONS 1-9: 0
SUM OF ALL RESPONSES TO PHQ QUESTIONS 1-9: 0

## 2021-01-07 NOTE — PROGRESS NOTES
Visit Information    Have you changed or started any medications since your last visit including any over-the-counter medicines, vitamins, or herbal medicines? no   Are you having any side effects from any of your medications? -  no  Have you stopped taking any of your medications? Is so, why? -  no    Have you seen any other physician or provider since your last visit? No  Have you had any other diagnostic tests since your last visit? No  Have you been seen in the emergency room and/or had an admission to a hospital since we last saw you? No  Have you had your routine dental cleaning in the past 6 months? no    Have you activated your [employer] account? If not, what are your barriers?  Yes     Patient Care Team:  Mirna Dow DO as PCP - General (Family Medicine)  Niurka Larson MD as PCP - [facility] Provider  Maggi Carter MD as Surgeon (Internal Medicine Cardiovascular Disease)  Ezio Dowling MD as Consulting Physician (Gastroenterology)    Medical History Review  Past Medical, Family, and Social History reviewed and does contribute to the patient presenting condition    Health Maintenance   Topic Date Due    DTaP/Tdap/Td vaccine (1 - Tdap) 10/09/1971    Annual Wellness Visit (AWV)  06/19/2019    A1C test (Diabetic or Prediabetic)  11/25/2020    Lipid screen  11/23/2021    Potassium monitoring  11/23/2021    Creatinine monitoring  11/23/2021    Colon cancer screen colonoscopy  05/22/2025    Flu vaccine  Completed    Shingles Vaccine  Completed    Pneumococcal 65+ years Vaccine  Completed    AAA screen  Completed    Hepatitis C screen  Completed    Hepatitis A vaccine  Aged Out    Hepatitis B vaccine  Aged Out    Hib vaccine  Aged Out    Meningococcal (ACWY) vaccine  Aged Out

## 2021-01-07 NOTE — PROGRESS NOTES
This is a chronic problem. The current episode started more than 1 year ago. The problem is uncontrolled. Associated symptoms include anxiety. Pertinent negatives include no chest pain, headaches, malaise/fatigue, neck pain, orthopnea, palpitations, peripheral edema, PND or shortness of breath. Risk factors for coronary artery disease include male gender, stress, obesity and family history. Past treatments include ACE inhibitors, beta blockers and calcium channel blockers. The current treatment provides moderate improvement. Compliance problems include diet and psychosocial issues. Hypertensive end-organ damage includes CAD/MI and left ventricular hypertrophy. There is no history of CVA or heart failure. Identifiable causes of hypertension include sleep apnea. There is no history of coarctation of the aorta, hyperaldosteronism or a thyroid problem.        Hemoglobin A1C (%)   Date Value   11/25/2019 6.2 (H)   11/05/2018 5.4   12/21/2017 5.5         ( goal A1C is < 7)   No results found for: LABMICR  LDL Cholesterol (mg/dL)   Date Value   11/23/2020 86   11/25/2019 70   11/05/2018 128       (goal LDL is <100)   AST (U/L)   Date Value   11/23/2020 32     ALT (U/L)   Date Value   11/23/2020 26     BUN (mg/dL)   Date Value   11/23/2020 17     BP Readings from Last 3 Encounters:   01/07/21 (!) 150/82   07/29/20 (!) 165/91   02/11/20 (!) 161/89          (goal 120/80)    Past Medical History:   Diagnosis Date    Arthritis     Asthma     Bell's palsy     [facility]    Bell's palsy 2014    Chicken pox     Chronic back pain     Chronic kidney disease     COPD (chronic obstructive pulmonary disease) (HCC)     Diverticular disease     Environmental allergies     Heart disease     has stent    Hyperlipidemia     Measles     Mumps       Past Surgical History:   Procedure Laterality Date    COLONOSCOPY  05/2015    Dr Dung Kline,  normal, recheck 10 years  CORONARY ANGIOPLASTY WITH STENT PLACEMENT      FRACTURE SURGERY Left 2011    hand metacarple, [facility] 83  1132    umbilical    NECK SURGERY      benign lump    TONSILLECTOMY  age 11   Etheleen Daifeoma [de-identified]         Family History   Problem Relation Age of Onset    High Blood Pressure Mother     High Cholesterol Mother     Alzheimer's Disease Mother     Stroke Mother     High Cholesterol Father     High Blood Pressure Father        Social History     Tobacco Use    Smoking status: Former Smoker     Packs/day: 0.50     Years: 20.00     Pack years: 10.00     Types: Cigarettes     Quit date: 10/25/2004     Years since quittin.2    Smokeless tobacco: Never Used   Substance Use Topics    Alcohol use: Yes     Alcohol/week: 4.0 - 7.0 standard drinks     Types: 3 Cans of beer, 1 - 4 Standard drinks or equivalent per week     Comment: beer      Current Outpatient Medications   Medication Sig Dispense Refill    metoprolol tartrate (LOPRESSOR) 25 MG tablet 2 times daily       sildenafil (REVATIO) 20 MG tablet Take 1 tablet by mouth daily as needed (erectile dysfunction) 9 tablet 5    lisinopril (PRINIVIL;ZESTRIL) 30 MG tablet Take 2 tablets once daily 60 tablet 5    SYMBICORT 160-4.5 MCG/ACT AERO INHALE TWO PUFFS BY MOUTH TWICE A DAY 1 Inhaler 5    GLUCOSAMINE-CHONDROITIN DS PO Take by mouth      methocarbamol (ROBAXIN) 500 MG tablet Take 1 tablet by mouth every 6 hours as needed (muscle spasm.) Sedation warning.  90 tablet 3    diltiazem (TIAZAC) 120 MG extended release capsule Take 1 capsule by mouth daily 1 capsule 0    rosuvastatin (CRESTOR) 20 MG tablet Take 1 tablet by mouth nightly 90 tablet 3    albuterol sulfate  (90 Base) MCG/ACT inhaler Inhale 2 puffs into the lungs every 6 hours as needed for Wheezing or Shortness of Breath 3 Inhaler 1    b complex vitamins capsule Take 1 capsule by mouth daily  Aspirin Buf,[id number], (BUFFERED ASPIRIN) 325 MG TABS Take by mouth      Psyllium (METAMUCIL PO) Take by mouth Indications: 2 tablets twice daily       Coenzyme Q10 (COQ10) 100 MG CAPS Take 100 mg by mouth. One daily        Multiple Vitamin (MULTIVITAMIN PO) Take  by mouth. One daily         Omega-3 Fatty Acids (FISH OIL) 1000 MG CPDR Take 3 tablets by mouth Daily. No current facility-administered medications for this visit. Allergies   Allergen Reactions    Amlodipine Swelling     Leg Swelling    Avapro [Irbesartan]     Lipitor Other (See Comments)     insomnia    Norvasc [Amlodipine Besylate] Swelling          Health Maintenance   Topic Date Due    A1C test (Diabetic or Prediabetic)  01/07/2022 (Originally 11/25/2020)    DTaP/Tdap/Td vaccine (1 - Tdap) 01/07/2022 (Originally 10/9/1969)    Annual Wellness Visit (AWV)  01/07/2023 (Originally 6/19/2019)    Lipid screen  11/23/2021    Potassium monitoring  11/23/2021    Creatinine monitoring  11/23/2021    Colon cancer screen colonoscopy  05/22/2025    Flu vaccine  Completed    Shingles Vaccine  Completed    Pneumococcal 65+ years Vaccine  Completed    AAA screen  Completed    Hepatitis C screen  Completed    Hepatitis A vaccine  Aged Out    Hepatitis B vaccine  Aged Out    Hib vaccine  Aged Out    Meningococcal (ACWY) vaccine  Aged Out       Subjective:     Review of Systems   Constitutional: Negative for activity change, appetite change, chills, diaphoresis, fatigue, fever, malaise/fatigue and unexpected weight change. HENT: Negative for tinnitus, trouble swallowing and voice change. Eyes: Negative for visual disturbance. Respiratory: Positive for apnea. Negative for cough, choking, chest tightness, shortness of breath, wheezing and stridor. Cardiovascular: Negative for chest pain, palpitations, orthopnea, leg swelling and PND. Gastrointestinal: Negative for abdominal pain, blood in stool, constipation, diarrhea, nausea, rectal pain and vomiting. Genitourinary: Positive for difficulty urinating, frequency, incomplete emptying and nocturia. Negative for decreased libido, decreased urine volume, discharge, hematuria, penile pain, penile swelling, scrotal swelling, testicular pain and urgency. Musculoskeletal: Negative for arthralgias and neck pain. Skin: Negative for color change, pallor, rash and wound. Allergic/Immunologic: Positive for immunocompromised state. Neurological: Negative for dizziness, seizures, speech difficulty, numbness and headaches. Hematological: Negative for adenopathy. Does not bruise/bleed easily. Psychiatric/Behavioral: Negative for self-injury, sleep disturbance and suicidal ideas. The patient is nervous/anxious. Objective:      Physical Exam  Vitals signs and nursing note reviewed. Constitutional:       General: He is not in acute distress. Appearance: Normal appearance. He is well-developed. He is obese. He is not ill-appearing, toxic-appearing or diaphoretic. HENT:      Head: Normocephalic and atraumatic. Right Ear: Tympanic membrane, ear canal and external ear normal.      Left Ear: Tympanic membrane, ear canal and external ear normal.      Mouth/Throat:      Mouth: Mucous membranes are moist.   Eyes:      Extraocular Movements: Extraocular movements intact. Pupils: Pupils are equal, round, and reactive to light. Neck:      Musculoskeletal: Normal range of motion and neck supple. No neck rigidity. Thyroid: No thyroid mass or thyroid tenderness. Vascular: No carotid bruit. Cardiovascular:      Rate and Rhythm: Normal rate and regular rhythm. No extrasystoles are present. Pulses: Normal pulses. Heart sounds: Normal heart sounds, S1 normal and S2 normal. Heart sounds not distant. No murmur.    Pulmonary: Effort: Pulmonary effort is normal. No bradypnea, accessory muscle usage, prolonged expiration, respiratory distress or retractions. Breath sounds: Normal breath sounds and air entry. No stridor, decreased air movement or transmitted upper airway sounds. No decreased breath sounds, wheezing, rhonchi or rales. Abdominal:      General: Bowel sounds are normal.      Palpations: Abdomen is soft. There is no hepatomegaly or splenomegaly. Tenderness: There is no abdominal tenderness. Musculoskeletal:      Right shoulder: He exhibits no tenderness, no bony tenderness, no pain, no spasm, normal pulse and normal strength. Left knee: He exhibits normal range of motion, no swelling, no effusion, no ecchymosis, no deformity, no laceration and no erythema. No tenderness found. Lumbar back: He exhibits spasm. He exhibits normal range of motion, no tenderness, no bony tenderness, no swelling, no edema, no deformity, no pain and normal pulse. Right lower leg: No edema. Left lower leg: No edema. Lymphadenopathy:      Cervical: No cervical adenopathy. Skin:     General: Skin is warm and dry. Findings: No rash. Neurological:      General: No focal deficit present. Mental Status: He is alert and oriented to person, place, and time. Cranial Nerves: Cranial nerves are intact. No cranial nerve deficit. Sensory: No sensory deficit. Motor: Motor function is intact. No atrophy or abnormal muscle tone. Coordination: Coordination is intact. Psychiatric:         Mood and Affect: Mood normal.         Behavior: Behavior normal.         Thought Content:  Thought content normal.         Judgment: Judgment normal.       BP (!) 150/82   Pulse 60   Temp 96.9 °F (36.1 °C) (Temporal)   Resp 18   Ht 6' (1.829 m)   Wt 215 lb (97.5 kg)   SpO2 98%   BMI 29.16 kg/m²     Assessment:       Diagnosis Orders 1. Coronary artery disease involving native coronary artery of native heart with angina pectoris ([facility] [address]     2. Essential hypertension     3. COPD mixed type ([facility] [address]     4. Erectile dysfunction, unspecified erectile dysfunction type  Psa screening    Testosterone   5. Encounter for screening for malignant neoplasm of prostate   Psa screening             Plan:       Return in about 6 weeks (around 2/18/2021), or if symptoms worsen or fail to improve, for bp check. Orders Placed This Encounter   Procedures    Psa screening     Standing Status:   Future     Standing Expiration Date:   1/7/2022    Testosterone     Standing Status:   Future     Standing Expiration Date:   1/7/2022     Orders Placed This Encounter   Medications    sildenafil (REVATIO) 20 MG tablet     Sig: Take 1 tablet by mouth daily as needed (erectile dysfunction)     Dispense:  9 tablet     Refill:  5    lisinopril (PRINIVIL;ZESTRIL) 30 MG tablet     Sig: Take 2 tablets once daily     Dispense:  60 tablet     Refill:  5    increase lisinopril to 60 mg daily  ie 2 30 mg tablets ; can take at the same time   Keep bp log   Follow up with specialists as scheduled   Seek immediate medical attention for any chest pain , severe trouble breathing and/or visual changes. Trial low dose viagra for ED  Check psa and Tamy Dumont with cardio before starting generic viagra   Reviewed all other labs with patient   Call with q/c  F/u in 4-6 weeks for bp check   Call with q/c        Patientgiven educational materials - see patient instructions. Discussed use, benefit,and side effects of prescribed medications. All patient questions answered. Ptvoiced understanding. Reviewed health maintenance. Instructed to continue currentmedications, diet and exercise. Patient agreed with treatment plan. Follow up asdirected.      Electronically signed by Sondra Wellington DO on 1/7/2021 at 2:08 PM

## 2021-01-21 ENCOUNTER — HOSPITAL ENCOUNTER (OUTPATIENT)
Age: 71
Setting detail: SPECIMEN
Discharge: HOME | End: 2021-01-21
Payer: MEDICARE

## 2021-01-21 DIAGNOSIS — N52.9 ERECTILE DYSFUNCTION, UNSPECIFIED ERECTILE DYSFUNCTION TYPE: ICD-10-CM

## 2021-01-21 DIAGNOSIS — Z12.5 ENCOUNTER FOR SCREENING FOR MALIGNANT NEOPLASM OF PROSTATE: ICD-10-CM

## 2021-01-21 LAB — PROSTATE SPECIFIC ANTIGEN: 1.31 UG/L

## 2021-01-22 LAB — TESTOSTERONE TOTAL: 549 NG/DL (ref 220–1000)

## 2021-02-18 ENCOUNTER — OFFICE VISIT (OUTPATIENT)
Dept: FAMILY MEDICINE CLINIC | Age: 71
End: 2021-02-18
Payer: MEDICARE

## 2021-02-18 VITALS
WEIGHT: 217 LBS | HEIGHT: 72 IN | DIASTOLIC BLOOD PRESSURE: 84 MMHG | SYSTOLIC BLOOD PRESSURE: 160 MMHG | BODY MASS INDEX: 29.39 KG/M2 | HEART RATE: 65 BPM | RESPIRATION RATE: 18 BRPM | OXYGEN SATURATION: 95 %

## 2021-02-18 DIAGNOSIS — I25.119 CORONARY ARTERY DISEASE INVOLVING NATIVE CORONARY ARTERY OF NATIVE HEART WITH ANGINA PECTORIS (HCC): ICD-10-CM

## 2021-02-18 DIAGNOSIS — I10 ESSENTIAL HYPERTENSION: Primary | ICD-10-CM

## 2021-02-18 PROCEDURE — 1123F ACP DISCUSS/DSCN MKR DOCD: CPT | Performed by: INTERNAL MEDICINE

## 2021-02-18 PROCEDURE — 3017F COLORECTAL CA SCREEN DOC REV: CPT | Performed by: INTERNAL MEDICINE

## 2021-02-18 PROCEDURE — 1036F TOBACCO NON-USER: CPT | Performed by: INTERNAL MEDICINE

## 2021-02-18 PROCEDURE — G8417 CALC BMI ABV UP PARAM F/U: HCPCS | Performed by: INTERNAL MEDICINE

## 2021-02-18 PROCEDURE — G8484 FLU IMMUNIZE NO ADMIN: HCPCS | Performed by: INTERNAL MEDICINE

## 2021-02-18 PROCEDURE — 4040F PNEUMOC VAC/ADMIN/RCVD: CPT | Performed by: INTERNAL MEDICINE

## 2021-02-18 PROCEDURE — 99213 OFFICE O/P EST LOW 20 MIN: CPT | Performed by: INTERNAL MEDICINE

## 2021-02-18 PROCEDURE — G8427 DOCREV CUR MEDS BY ELIG CLIN: HCPCS | Performed by: INTERNAL MEDICINE

## 2021-02-18 RX ORDER — METOPROLOL TARTRATE 50 MG/1
50 TABLET, FILM COATED ORAL 2 TIMES DAILY
Qty: 180 TABLET | Refills: 1 | Status: SHIPPED | OUTPATIENT
Start: 2021-02-18

## 2021-02-18 ASSESSMENT — ENCOUNTER SYMPTOMS
SHORTNESS OF BREATH: 0
ORTHOPNEA: 0
VOMITING: 0
ABDOMINAL PAIN: 0
COUGH: 0
NAUSEA: 0
CHEST TIGHTNESS: 0
BLOOD IN STOOL: 0
ANAL BLEEDING: 0
RECTAL PAIN: 0
CONSTIPATION: 0
CHOKING: 0
DIARRHEA: 0
BLURRED VISION: 0

## 2021-02-18 NOTE — PROGRESS NOTES
[facility]  [address] [address]  Dept: [phone]  Dept Fax: [phone]    Bogdan Hodges a 78 y.o. male who presents today for his medical conditions/complaints as notedbelow. Mindyclaudia Keenan is c/o of   Chief Complaint   Patient presents with    Hypertension     higher side at home still-145/87 at home today    Discuss Medications       HPI:     Here for f/u   bp still elevated/not controlled  Better at home   140s/80s typically   Number from this AM listed   No cardiac sxs   Taking the 30 mg BID   Also taking other bp meds  Metoprolol was just started in October 2020 by cardio   Does not have appt with them though until the end of April     Did take the sildenafil 20 mg once and it did help alto   No SE  psa and Testerone were normal   Not due for any other labs as of yet  Was pre diabetic in the past , no hx of DM       Hypertension  This is a chronic problem. The current episode started more than 1 year ago. The problem has been waxing and waning since onset. The problem is uncontrolled. Pertinent negatives include no anxiety, blurred vision, chest pain, headaches, malaise/fatigue, neck pain, orthopnea, palpitations, peripheral edema, PND, shortness of breath or sweats. Risk factors for coronary artery disease include stress, male gender, sedentary lifestyle, family history and dyslipidemia. Past treatments include calcium channel blockers, beta blockers and ACE inhibitors. The current treatment provides moderate improvement. Compliance problems include diet, exercise and medication side effects. Hypertensive end-organ damage includes CAD/MI and left ventricular hypertrophy. There is no history of angina or kidney disease. There is no history of coarctation of the aorta, hyperaldosteronism, hypercortisolism or pheochromocytoma.        Hemoglobin A1C (%)   Date Value   11/25/2019 6.2 (H)   11/05/2018 5.4   12/21/2017 5.5 ( goal A1C is < 7)   No results found for: LABMICR  LDL Cholesterol (mg/dL)   Date Value   2020 86   2019 70   2018 128       (goal LDL is <100)   AST (U/L)   Date Value   2020 32     ALT (U/L)   Date Value   2020 26     BUN (mg/dL)   Date Value   2020 17     BP Readings from Last 3 Encounters:   21 (!) 160/84   21 (!) 150/82   20 (!) 165/91          (goal 120/80)    Past Medical History:   Diagnosis Date    Arthritis     Asthma     [facility]'s [facility]y -    [facility]    Bell's palsy 2014    Chicken pox     Chronic back pain     Chronic kidney disease     COPD (chronic obstructive pulmonary disease) (HCC)     Diverticular disease     Environmental allergies     Heart disease     has stent    Hyperlipidemia     Measles     Mumps       Past Surgical History:   Procedure Laterality Date    COLONOSCOPY  2015    Dr Erica Stringer,  normal, recheck 10 years     CORONARY ANGIOPLASTY WITH STENT PLACEMENT      FRACTURE SURGERY Left 2011    hand metacarple, in [facility] 83  0212    umbilical    NECK SURGERY      benign lump    TONSILLECTOMY  age 11   Scheryl Gemma VASECTOMY         Family History   Problem Relation Age of Onset    High Blood Pressure Mother     High Cholesterol Mother     Alzheimer's Disease Mother     Stroke Mother     High Cholesterol Father     High Blood Pressure Father        Social History     Tobacco Use    Smoking status: Former Smoker     Packs/day: 0.50     Years: 20.00     Pack years: 10.00     Types: Cigarettes     Quit date: 10/25/2004     Years since quittin.3    Smokeless tobacco: Never Used   Substance Use Topics    Alcohol use:  Yes     Alcohol/week: 4.0 - 7.0 standard drinks     Types: 3 Cans of beer, 1 - 4 Standard drinks or equivalent per week     Comment: beer      Current Outpatient Medications   Medication Sig Dispense Refill  Lipid screen  11/23/2021    Potassium monitoring  11/23/2021    Creatinine monitoring  11/23/2021    Colon cancer screen colonoscopy  05/22/2025    Flu vaccine  Completed    Shingles Vaccine  Completed    Pneumococcal 65+ years Vaccine  Completed    AAA screen  Completed    Hepatitis C screen  Completed    Hepatitis A vaccine  Aged Out    Hepatitis B vaccine  Aged Out    Hib vaccine  Aged Out    Meningococcal (ACWY) vaccine  Aged Out       Subjective:     Review of Systems   Constitutional: Negative for activity change, appetite change, chills, diaphoresis, fatigue, fever, malaise/fatigue and unexpected weight change. Eyes: Negative for blurred vision and visual disturbance. Respiratory: Negative for cough, choking, chest tightness and shortness of breath. Cardiovascular: Negative for chest pain, palpitations, orthopnea, leg swelling and PND. Gastrointestinal: Negative for abdominal pain, anal bleeding, blood in stool, constipation, diarrhea, nausea, rectal pain and vomiting. Genitourinary: Negative for difficulty urinating and discharge. Musculoskeletal: Negative for arthralgias and neck pain. Skin: Negative for rash. Allergic/Immunologic: Negative for immunocompromised state. Neurological: Negative for dizziness, weakness, light-headedness and headaches. Psychiatric/Behavioral: Negative for sleep disturbance. Objective:      Physical Exam  Vitals signs and nursing note reviewed. Constitutional:       General: He is not in acute distress. Appearance: Normal appearance. He is normal weight. He is not ill-appearing, toxic-appearing or diaphoretic. HENT:      Head: Normocephalic and atraumatic. Eyes:      Extraocular Movements: Extraocular movements intact. Neck:      Musculoskeletal: Normal range of motion and neck supple. Cardiovascular:      Rate and Rhythm: Normal rate and regular rhythm. Heart sounds: No murmur. No friction rub. No gallop. Pulmonary:      Effort: Pulmonary effort is normal.      Breath sounds: Normal breath sounds. No stridor. No wheezing, rhonchi or rales. Chest:      Chest wall: No tenderness. Musculoskeletal: Normal range of motion. Right lower leg: No edema. Left lower leg: No edema. Skin:     General: Skin is warm and dry. Neurological:      General: No focal deficit present. Mental Status: He is alert and oriented to person, place, and time. Cranial Nerves: No cranial nerve deficit. Psychiatric:         Mood and Affect: Mood normal.         Behavior: Behavior normal.         Thought Content: Thought content normal.         Judgment: Judgment normal.       BP (!) 160/84   Pulse 65   Resp 18   Ht 6' (1.829 m)   Wt 217 lb (98.4 kg)   SpO2 95%   BMI 29.43 kg/m²     Assessment:       Diagnosis Orders   1. Essential hypertension     2. Coronary artery disease involving native coronary artery of native heart with angina pectoris ([facility] [address]               Plan:       Return in about 6 weeks (around 4/1/2021), or if symptoms worsen or fail to improve, for bp check, med refill. No orders of the defined types were placed in this encounter. Orders Placed This Encounter   Medications    metoprolol tartrate (LOPRESSOR) 50 MG tablet     Sig: Take 1 tablet by mouth 2 times daily     Dispense:  180 tablet     Refill:  1    increase metoprolol to 50 mg BID  reviewed SE; kaley if HR lowers ; call sooner if this occurs/sxs  ? discuss SHOBHA/sleep apnea sxs at f/u if not controlled   Continue other bp meds at current doses ie 60 mg lisinopril as well   Continue to keep log at home   Denies needing refills of any other meds     F/u with specialists as scheduled   F/u here in 4-6 weeks   Call with q/c   Seek immediate medical attention for any chest pain , severe trouble breathing and/or visual changes. Patientgiven educational materials - see patient instructions. Discussed use, benefit,and side effects of prescribed medications. All patient questions answered. Ptvoiced understanding. Reviewed health maintenance. Instructed to continue currentmedications, diet and exercise. Patient agreed with treatment plan. Follow up asdirected.      Electronically signed by Rosaura Harada, DO on 2/18/2021 at 11:01 AM

## 2021-02-18 NOTE — PROGRESS NOTES
Visit Information    Have you changed or started any medications since your last visit including any over-the-counter medicines, vitamins, or herbal medicines? no   Are you having any side effects from any of your medications? -  no  Have you stopped taking any of your medications? Is so, why? -  no    Have you seen any other physician or provider since your last visit? No  Have you had any other diagnostic tests since your last visit? No  Have you been seen in the emergency room and/or had an admission to a hospital since we last saw you? No  Have you had your routine dental cleaning in the past 6 months? no    Have you activated your [employer] account? If not, what are your barriers?  Yes     Patient Care Team:  Claudia De Oliveira DO as PCP - General (Family Medicine)  Claudia De Oliveira DO as PCP [facility]  Julia Ni MD as Surgeon (Internal Medicine Cardiovascular Disease)  Jovana Red MD as Consulting Physician (Gastroenterology)    Medical History Review  Past Medical, Family, and Social History reviewed and does contribute to the patient presenting condition    Health Maintenance   Topic Date Due    A1C test (Diabetic or Prediabetic)  01/07/2022 (Originally 11/25/2020)    DTaP/Tdap/Td vaccine (1 - Tdap) 01/07/2022 (Originally 10[birth year]9/1971)    Annual Wellness Visit (AWV)  01/07/2023 (Originally 6/19/2019)    Lipid screen  11/23/2021    Potassium monitoring  11/23/2021    Creatinine monitoring  11/23/2021    Colon cancer screen colonoscopy  05/22/2025    Flu vaccine  Completed    Shingles Vaccine  Completed    Pneumococcal 65+ years Vaccine  Completed    AAA screen  Completed    Hepatitis C screen  Completed    Hepatitis A vaccine  Aged Out    Hepatitis B vaccine  Aged Out    Hib vaccine  Aged Out    Meningococcal (ACWY) vaccine  Aged Out

## 2021-02-26 ENCOUNTER — VIRTUAL VISIT (OUTPATIENT)
Dept: FAMILY MEDICINE CLINIC | Age: 71
End: 2021-02-26
Payer: MEDICARE

## 2021-02-26 DIAGNOSIS — Z00.00 MEDICARE ANNUAL WELLNESS VISIT, SUBSEQUENT: Primary | ICD-10-CM

## 2021-02-26 DIAGNOSIS — J45.40 MODERATE PERSISTENT ASTHMA WITHOUT COMPLICATION: ICD-10-CM

## 2021-02-26 DIAGNOSIS — J44.9 COPD MIXED TYPE (HCC): ICD-10-CM

## 2021-02-26 PROCEDURE — 3017F COLORECTAL CA SCREEN DOC REV: CPT | Performed by: INTERNAL MEDICINE

## 2021-02-26 PROCEDURE — G8484 FLU IMMUNIZE NO ADMIN: HCPCS | Performed by: INTERNAL MEDICINE

## 2021-02-26 PROCEDURE — 1123F ACP DISCUSS/DSCN MKR DOCD: CPT | Performed by: INTERNAL MEDICINE

## 2021-02-26 PROCEDURE — G0439 PPPS, SUBSEQ VISIT: HCPCS | Performed by: INTERNAL MEDICINE

## 2021-02-26 PROCEDURE — 4040F PNEUMOC VAC/ADMIN/RCVD: CPT | Performed by: INTERNAL MEDICINE

## 2021-02-26 RX ORDER — BUDESONIDE AND FORMOTEROL FUMARATE DIHYDRATE 160; 4.5 UG/1; UG/1
AEROSOL RESPIRATORY (INHALATION)
Qty: 1 INHALER | Refills: 11 | Status: SHIPPED | OUTPATIENT
Start: 2021-02-26

## 2021-02-26 ASSESSMENT — LIFESTYLE VARIABLES
HOW OFTEN DO YOU HAVE A DRINK CONTAINING ALCOHOL: TWO TO THREE TIMES A WEEK
HOW OFTEN DO YOU HAVE SIX OR MORE DRINKS ON ONE OCCASION: LESS THAN MONTHLY
AUDIT-C TOTAL SCORE: 0
HOW MANY STANDARD DRINKS CONTAINING ALCOHOL DO YOU HAVE ON A TYPICAL DAY: ONE OR TWO
HOW OFTEN DURING THE LAST YEAR HAVE YOU FAILED TO DO WHAT WAS NORMALLY EXPECTED FROM YOU BECAUSE OF DRINKING: NEVER
HOW OFTEN DURING THE LAST YEAR HAVE YOU BEEN UNABLE TO REMEMBER WHAT HAPPENED THE NIGHT BEFORE BECAUSE YOU HAD BEEN DRINKING: 0
HOW OFTEN DURING THE LAST YEAR HAVE YOU BEEN UNABLE TO REMEMBER WHAT HAPPENED THE NIGHT BEFORE BECAUSE YOU HAD BEEN DRINKING: NEVER
HOW OFTEN DO YOU HAVE SIX OR MORE DRINKS ON ONE OCCASION: 1
HAS A RELATIVE, FRIEND, DOCTOR, OR ANOTHER HEALTH PROFESSIONAL EXPRESSED CONCERN ABOUT YOUR DRINKING OR SUGGESTED YOU CUT DOWN: NO
HOW OFTEN DURING THE LAST YEAR HAVE YOU HAD A FEELING OF GUILT OR REMORSE AFTER DRINKING: NEVER
AUDIT TOTAL SCORE: 0
HOW OFTEN DURING THE LAST YEAR HAVE YOU FOUND THAT YOU WERE NOT ABLE TO STOP DRINKING ONCE YOU HAD STARTED: 0
HOW OFTEN DURING THE LAST YEAR HAVE YOU FOUND THAT YOU WERE NOT ABLE TO STOP DRINKING ONCE YOU HAD STARTED: NEVER
HOW OFTEN DURING THE LAST YEAR HAVE YOU NEEDED AN ALCOHOLIC DRINK FIRST THING IN THE MORNING TO GET YOURSELF GOING AFTER A NIGHT OF HEAVY DRINKING: NEVER
HAVE YOU OR SOMEONE ELSE BEEN INJURED AS A RESULT OF YOUR DRINKING: NO

## 2021-02-26 NOTE — PROGRESS NOTES
Medicare Annual Wellness Visit - Initial    Name: Stephanie Jefferson Date: 2021   MRN: [id number] Sex: Male   Age: 78 y.o. Ethnicity: Non-/Non    : 1950 Race: Nancy Retana is here for   Chief Complaint   Patient presents with    Medicare AWV    Medication Refill     symbicort        Screenings for behavioral, psychosocial and functional/safety risks, and cognitive dysfunction are all negative except as indicated below. These results, as well as other patient data from the [address] form, are documented in Flowsheets linked to this Encounter. Allergies   Allergen Reactions    Amlodipine Swelling     Leg Swelling    Avapro [Irbesartan]     Lipitor Other (See Comments)     insomnia    Norvasc [Amlodipine Besylate] Swelling       Prior to Visit Medications    Medication Sig Taking? Authorizing Provider   budesonide-formoterol (SYMBICORT) 160-4.5 MCG/ACT AERO INHALE TWO PUFFS BY MOUTH TWICE A DAY Yes Tayla Holder,    metoprolol tartrate (LOPRESSOR) 50 MG tablet Take 1 tablet by mouth 2 times daily Yes Tayla Holder DO   sildenafil (REVATIO) 20 MG tablet Take 1 tablet by mouth daily as needed (erectile dysfunction) Yes Elisha Lyn, DO   lisinopril (PRINIVIL;ZESTRIL) 30 MG tablet Take 2 tablets once daily Yes Tayla Holder,    GLUCOSAMINE-CHONDROITIN DS PO Take by mouth Yes Historical Provider, MD   methocarbamol (ROBAXIN) 500 MG tablet Take 1 tablet by mouth every 6 hours as needed (muscle spasm.) Sedation warning.  Yes Orest Cowden, MD   diltiazem [facility]) 120 MG extended release capsule Take 1 capsule by mouth daily Yes Orest Cowden, MD   rosuvastatin (CRESTOR) 20 MG tablet Take 1 tablet by mouth nightly Yes Orest Cowden, MD   albuterol sulfate  (90 Base) MCG/ACT inhaler Inhale 2 puffs into the lungs every 6 hours as needed for Wheezing or Shortness of Breath Yes Orest Cowden, MD b complex vitamins capsule Take 1 capsule by mouth daily  Yes Historical Provider, MD   Aspirin Buf,[id number], (BUFFERED ASPIRIN) 325 MG TABS Take by mouth Yes Historical Provider, MD   Psyllium (METAMUCIL PO) Take by mouth Indications: 2 tablets twice daily  Yes Historical Provider, MD   Coenzyme Q10 (COQ10) 100 MG CAPS Take 100 mg by mouth. One daily   Yes Froy Cesar MD   Multiple Vitamin (MULTIVITAMIN PO) Take  by mouth. One daily    Yes Historical Provider, MD   Omega-3 Fatty Acids (FISH OIL) 1000 MG CPDR Take 3 tablets by mouth Daily.    Yes Historical Provider, MD       Past Medical History:   Diagnosis Date    Arthritis     Asthma     Bell's palsy     [facility]    Bell's palsy 2014    Chicken pox     Chronic back pain     Chronic kidney disease     COPD (chronic obstructive pulmonary disease) ([address]     Diverticular disease     Environmental allergies     Heart disease     has stent    Hyperlipidemia     Measles     Mumps        Past Surgical History:   Procedure Laterality Date    COLONOSCOPY  05/2015    Dr Carlotta Prescott,  normal, recheck 10 years     CORONARY ANGIOPLASTY WITH STENT PLACEMENT  2009    FRACTURE SURGERY Left 11/2011    hand metacarple, in [facility] 83  1192    umbilical    NECK SURGERY  2000    benign lump    TONSILLECTOMY  age 11   [facility]ASECTOMY         Family History   Problem Relation Age of Onset    High Blood Pressure Mother     High Cholesterol Mother     Alzheimer's Disease Mother     Stroke Mother     High Cholesterol Father     High Blood Pressure Father        CareTeam (Including outside providers/suppliers regularly involved in providing care):   Patient Care Team:  Sophia Nyhan, DO as PCP - General (Family Medicine)  Sophia Nyhan, DO as PCP - [facility] Empaneled Provider  Anita Almanza MD as Surgeon (Internal Medicine Cardiovascular Disease) Michelle Reynolds MD as Consulting Physician (Gastroenterology)    Wt Readings from Last 3 Encounters:   21 217 lb (98.4 kg)   21 215 lb (97.5 kg)   20 210 lb (95.3 kg)     There were no vitals filed for this visit. The following problems were reviewed today and where indicated follow up appointments were made and/or referrals ordered. Risk Factor Screenings with Interventions     Fall Risk:  2 or more falls in past year?: no  Fall with injury in past year?: no  Fall Risk Interventions:    · NA    Depression:  PHQ-2 Score: 0  Depression Interventions:  · NA    Anxiety:     Anxiety Interventions:  · NA    Cognitive:     Cognitive Impairment Interventions:  · NA    Substance Abuse:  Social History     Socioeconomic History    Marital status:      Spouse name: Not on file    Number of children: Not on file    Years of education: Not on file    Highest education level: Not on file   Occupational History    Not on file   Social Needs    Financial resource strain: Not on file    Food insecurity     Worry: Not on file     Inability: Not on file    Transportation needs     Medical: Not on file     Non-medical: Not on file   Tobacco Use    Smoking status: Former Smoker     Packs/day: 0.50     Years: 20.00     Pack years: 10.00     Types: Cigarettes     Quit date: 10/25/2004     Years since quittin.3    Smokeless tobacco: Never Used   Substance and Sexual Activity    Alcohol use:  Yes     Alcohol/week: 4.0 - 7.0 standard drinks     Types: 3 Cans of beer, 1 - 4 Standard drinks or equivalent per week     Comment: beer    Drug use: No    Sexual activity: Yes     Partners: Female     Comment: spouse   Lifestyle    Physical activity     Days per week: Not on file     Minutes per session: Not on file    Stress: Not on file   Relationships    Social connections     Talks on phone: Not on file     Gets together: Not on file     Attends Alevism service: Not on file Active member of club or organization: Not on file     Attends meetings of clubs or organizations: Not on file     Relationship status: Not on file    Intimate partner violence     Fear of current or ex partner: Not on file     Emotionally abused: Not on file     Physically abused: Not on file     Forced sexual activity: Not on file   Other Topics Concern    Not on file   Social History Narrative    Not on file     Audit Questionnaire: Screen for Alcohol Misuse  How often do you have a drink containing alcohol?: Two to three times a week  How many standard drinks containing alcohol do you have on a typical day when drinking?: One or two  How often do you have six or more drinks on one occasion?: Less than monthly  Audit-C Score: 4  During the past year, how often have you found that you were not able to stop drinking once you had started?: Never  During the past year, how often have you failed to do what was normally expected of you because of drinking?: Never  During the past year, how often have you needed a drink in the morning to get yourself going after a heavy drinking session?: Never  During the past year, how often have you had a feeling of guilt or remorse after drinking?: Never  During the past year, have you been unable to remember what happened the night before because you had been drinking?: Never  Have you or someone else been injured as a result of your drinking?: No  Has a relative or friend, doctor or health worker been concerned about your drinking or suggested you cut down?: No  Total Score: 4  Substance Abuse Interventions:  · NA    Health Risk Assessment:     General  In general, how would you say your health is?: Very Good  In the past 7 days, have you experienced any of the following?  New or Increased Pain, New or Increased Fatigue, Loneliness, Social Isolation, Stress or Anger?: None of These  Do you get the social and emotional support that you need?: Yes  Influenza, High Dose (Fluzone 65 yrs and older) 09/12/2018, 10/23/2019    Influenza, Intradermal, Preservative free 12/02/2014    Influenza, Quadv, IM, (6 mo and older Fluzone, Flulaval, Fluarix and 3 yrs and older Afluria) 10/25/2016    Influenza, Quadv, IM, PF (6 mo and older Fluzone, Flulaval, Fluarix, and 3 yrs and older Afluria) 09/11/2017    Influenza, Quadv, adjuvanted, 65 yrs +, IM, PF (Fluad) 10/12/2020    Pneumococcal Conjugate 13-valent ([id number]) 11/14/2017    Pneumococcal Polysaccharide ([id number]) 10/25/2007, 10/05/2018    Td, unspecified formulation 11/25/2007    Zoster Recombinant (Shingrix) 05/15/2019, 07/23/2019        Health Maintenance   Topic Date Due    A1C test (Diabetic or Prediabetic)  01/07/2022 (Originally 11/25/2020)    DTaP/Tdap/Td vaccine (1 - Tdap) 01/07/2022 (Originally 10[birth year]9/1971)    Annual Wellness Visit (AWV)  01/07/2023 (Originally 6/19/2019)    COVID-19 Vaccine (2 of 2 - Moderna series) 03/12/2021    Lipid screen  11/23/2021    Potassium monitoring  11/23/2021    Creatinine monitoring  11/23/2021    Colon cancer screen colonoscopy  05/22/2025    Flu vaccine  Completed    Shingles Vaccine  Completed    Pneumococcal 65+ years Vaccine  Completed    AAA screen  Completed    Hepatitis C screen  Completed    Hepatitis A vaccine  Aged Out    Hepatitis B vaccine  Aged Out    Hib vaccine  Aged Out    Meningococcal (ACWY) vaccine  Aged Out       Recommendations for [place] Due: see orders.   Recommended screening schedule for the next 5-10 years is provided to the patient in written form: see Patient Instructions/AVS.

## 2021-04-01 ENCOUNTER — OFFICE VISIT (OUTPATIENT)
Dept: FAMILY MEDICINE CLINIC | Age: 71
End: 2021-04-01
Payer: MEDICARE

## 2021-04-01 VITALS
WEIGHT: 214 LBS | BODY MASS INDEX: 28.99 KG/M2 | DIASTOLIC BLOOD PRESSURE: 82 MMHG | RESPIRATION RATE: 18 BRPM | HEIGHT: 72 IN | SYSTOLIC BLOOD PRESSURE: 156 MMHG | OXYGEN SATURATION: 95 % | HEART RATE: 53 BPM

## 2021-04-01 DIAGNOSIS — I10 ESSENTIAL HYPERTENSION: Primary | ICD-10-CM

## 2021-04-01 DIAGNOSIS — R73.03 PREDIABETES: ICD-10-CM

## 2021-04-01 DIAGNOSIS — L98.9 SKIN LESION: ICD-10-CM

## 2021-04-01 LAB — HBA1C MFR BLD: 6 %

## 2021-04-01 PROCEDURE — 1036F TOBACCO NON-USER: CPT | Performed by: INTERNAL MEDICINE

## 2021-04-01 PROCEDURE — 83036 HEMOGLOBIN GLYCOSYLATED A1C: CPT | Performed by: INTERNAL MEDICINE

## 2021-04-01 PROCEDURE — 1123F ACP DISCUSS/DSCN MKR DOCD: CPT | Performed by: INTERNAL MEDICINE

## 2021-04-01 PROCEDURE — G8417 CALC BMI ABV UP PARAM F/U: HCPCS | Performed by: INTERNAL MEDICINE

## 2021-04-01 PROCEDURE — 3017F COLORECTAL CA SCREEN DOC REV: CPT | Performed by: INTERNAL MEDICINE

## 2021-04-01 PROCEDURE — G8427 DOCREV CUR MEDS BY ELIG CLIN: HCPCS | Performed by: INTERNAL MEDICINE

## 2021-04-01 PROCEDURE — 4040F PNEUMOC VAC/ADMIN/RCVD: CPT | Performed by: INTERNAL MEDICINE

## 2021-04-01 PROCEDURE — 99213 OFFICE O/P EST LOW 20 MIN: CPT | Performed by: INTERNAL MEDICINE

## 2021-04-01 ASSESSMENT — ENCOUNTER SYMPTOMS
CHEST TIGHTNESS: 0
ORTHOPNEA: 0
COUGH: 0
BLURRED VISION: 0
ABDOMINAL PAIN: 0
DIARRHEA: 0
APNEA: 1
CONSTIPATION: 0
BACK PAIN: 0
WHEEZING: 0
CHOKING: 0
SHORTNESS OF BREATH: 0

## 2021-04-01 NOTE — PROGRESS NOTES
[address] [facility]  [address] [facility] [address]  Dept: [phone]  Dept Fax: [phone]    Marlys Ritter a 78 y.o. male who presents today for his medical conditions/complaints as notedbelow. Lucy Soriano is c/o of   Chief Complaint   Patient presents with    Medication Check    Hypertension     140-160/90s at home HR low 50s       HPI:     bp still the same essentially as last visit  Did increase metoprolol from 25 mg bid to 50 mg bid  Hr is down but no side effects or issues from that per patient. feels the celestino eoverall   No cardiac sxs   bp similar at home to what they are here   On max dose of lisinopril   Also cardizem but could not tolerate 240 dose in past due to a lot of leg swelling   Was on hctz  Years ago a low dose   All recent labs normal   Last stress and echo 3 yrs ago were overall normal   Has patrick but is compliant with cpap machine nightly   Unclear as to why bp uncontrolled now   Sees cardio at the end of the month 4/25     Hypertension  This is a chronic problem. The current episode started more than 1 year ago. The problem has been rapidly worsening since onset. The problem is uncontrolled. Pertinent negatives include no anxiety, blurred vision, chest pain, headaches, malaise/fatigue, neck pain, orthopnea, palpitations, peripheral edema, PND or shortness of breath. There are no associated agents to hypertension. Risk factors for coronary artery disease include family history, dyslipidemia, sedentary lifestyle and male gender. Past treatments include beta blockers, calcium channel blockers and ACE inhibitors. The current treatment provides mild improvement. Compliance problems include diet and exercise. Hypertensive end-organ damage includes CAD/MI. There is no history of angina, kidney disease, CVA or heart failure. Identifiable causes of hypertension include sleep apnea.  There is no history of coarctation of the aorta, hyperaldosteronism or hypercortisolism. Hemoglobin A1C (%)   Date Value   2021 6.0   2019 6.2 (H)   2018 5.4         ( goal A1C is < 7)   No results found for: LABMICR  LDL Cholesterol (mg/dL)   Date Value   2020 86   2019 70   2018 128       (goal LDL is <100)   AST (U/L)   Date Value   2020 32     ALT (U/L)   Date Value   2020 26     BUN (mg/dL)   Date Value   2020 17     BP Readings from Last 3 Encounters:   21 (!) 156/82   21 (!) 160/84   21 (!) 150/82          (goal 120/80)    Past Medical History:   Diagnosis Date    Arthritis     Asthma     [facility]'s [facility]y -    [facility]    Bell's palsy 2014    Chicken pox     Chronic back pain     Chronic kidney disease     COPD (chronic obstructive pulmonary disease) (HCC)     Diverticular disease     Environmental allergies     Heart disease     has stent    Hyperlipidemia     Measles     Mumps       Past Surgical History:   Procedure Laterality Date    COLONOSCOPY  2015    Dr Stephanie Grey,  normal, recheck 10 years     CORONARY ANGIOPLASTY WITH STENT PLACEMENT  2009    FRACTURE SURGERY Left 2011    hand metacarple, in [facility] 83  1196    umbilical    NECK SURGERY      benign lump    TONSILLECTOMY  age 11   [facility] VASECTOMY         Family History   Problem Relation Age of Onset    High Blood Pressure Mother     High Cholesterol Mother     Alzheimer's Disease Mother     Stroke Mother     High Cholesterol Father     High Blood Pressure Father        Social History     Tobacco Use    Smoking status: Former Smoker     Packs/day: 0.50     Years: 20.00     Pack years: 10.00     Types: Cigarettes     Quit date: 10/25/2004     Years since quittin.4    Smokeless tobacco: Never Used   Substance Use Topics    Alcohol use:  Yes     Alcohol/week: 4.0 - 7.0 standard drinks     Types: 3 Cans of beer, 1 - 4 Standard drinks or equivalent per week Comment: dominick      Current Outpatient Medications   Medication Sig Dispense Refill    budesonide-formoterol (SYMBICORT) 160-4.5 MCG/ACT AERO INHALE TWO PUFFS BY MOUTH TWICE A DAY 1 Inhaler 11    metoprolol tartrate (LOPRESSOR) 50 MG tablet Take 1 tablet by mouth 2 times daily 180 tablet 1    sildenafil (REVATIO) 20 MG tablet Take 1 tablet by mouth daily as needed (erectile dysfunction) 9 tablet 5    lisinopril (PRINIVIL;ZESTRIL) 30 MG tablet Take 2 tablets once daily 60 tablet 5    GLUCOSAMINE-CHONDROITIN DS PO Take by mouth      methocarbamol (ROBAXIN) 500 MG tablet Take 1 tablet by mouth every 6 hours as needed (muscle spasm.) Sedation warning. 90 tablet 3    diltiazem (TIAZAC) 120 MG extended release capsule Take 1 capsule by mouth daily 1 capsule 0    rosuvastatin (CRESTOR) 20 MG tablet Take 1 tablet by mouth nightly 90 tablet 3    albuterol sulfate  (90 Base) MCG/ACT inhaler Inhale 2 puffs into the lungs every 6 hours as needed for Wheezing or Shortness of Breath 3 Inhaler 1    b complex vitamins capsule Take 1 capsule by mouth daily       Aspirin Buf,[id number], (BUFFERED ASPIRIN) 325 MG TABS Take by mouth      Psyllium (METAMUCIL PO) Take by mouth Indications: 2 tablets twice daily       Coenzyme Q10 (COQ10) 100 MG CAPS Take 100 mg by mouth. One daily        Multiple Vitamin (MULTIVITAMIN PO) Take  by mouth. One daily         Omega-3 Fatty Acids (FISH OIL) 1000 MG CPDR Take 3 tablets by mouth Daily. No current facility-administered medications for this visit.       Allergies   Allergen Reactions    Amlodipine Swelling     Leg Swelling    Avapro [Irbesartan]     Lipitor Other (See Comments)     insomnia    Norvasc [Amlodipine Besylate] Swelling          Health Maintenance   Topic Date Due    DTaP/Tdap/Td vaccine (1 - Tdap) 01/07/2022 (Originally [birth year]9/1969)    Lipid screen  11/23/2021    Potassium monitoring  11/23/2021    Creatinine monitoring  11/23/2021    Annual Wellness Visit (AWV)  02/27/2022    A1C test (Diabetic or Prediabetic)  04/01/2022    Colon cancer screen colonoscopy  05/22/2025    Flu vaccine  Completed    Shingles Vaccine  Completed    Pneumococcal 65+ years Vaccine  Completed    COVID-19 Vaccine  Completed    AAA screen  Completed    Hepatitis C screen  Completed    Hepatitis A vaccine  Aged Out    Hepatitis B vaccine  Aged Out    Hib vaccine  Aged Out    Meningococcal (ACWY) vaccine  Aged Out       Subjective:     Review of Systems   Constitutional: Negative for activity change, appetite change, chills, diaphoresis, fatigue, fever, malaise/fatigue and unexpected weight change. Eyes: Negative for blurred vision and visual disturbance. Respiratory: Positive for apnea. Negative for cough, choking, chest tightness, shortness of breath and wheezing. Cardiovascular: Negative for chest pain, palpitations, orthopnea, leg swelling and PND. Gastrointestinal: Negative for abdominal pain, constipation and diarrhea. Genitourinary: Negative for decreased urine volume, frequency and urgency. Musculoskeletal: Negative for arthralgias, back pain and neck pain. Skin: Negative for rash. Neurological: Negative for dizziness, syncope, light-headedness and headaches. Psychiatric/Behavioral: Negative for sleep disturbance. The patient is not nervous/anxious. Objective:      Physical Exam  Vitals signs and nursing note reviewed. Constitutional:       General: He is not in acute distress. Appearance: Normal appearance. He is normal weight. He is not ill-appearing, toxic-appearing or diaphoretic. HENT:      Head: Normocephalic and atraumatic. Eyes:      Extraocular Movements: Extraocular movements intact. Neck:      Musculoskeletal: Normal range of motion and neck supple. Cardiovascular:      Rate and Rhythm: Regular rhythm. Bradycardia present. Pulses: Normal pulses. Heart sounds: Normal heart sounds. No murmur.  No friction rub. No gallop. Pulmonary:      Effort: Pulmonary effort is normal. No respiratory distress. Breath sounds: Normal breath sounds. No stridor. No wheezing, rhonchi or rales. Chest:      Chest wall: No tenderness. Skin:     General: Skin is warm and dry. Findings: Lesion present. Neurological:      General: No focal deficit present. Mental Status: He is alert and oriented to person, place, and time. Cranial Nerves: No cranial nerve deficit. Psychiatric:         Mood and Affect: Mood normal.         Behavior: Behavior normal.         Thought Content: Thought content normal.         Judgment: Judgment normal.       BP (!) 156/82   Pulse 53   Resp 18   Ht 6' (1.829 m)   Wt 214 lb (97.1 kg)   SpO2 95%   BMI 29.02 kg/m²     Assessment:       Diagnosis Orders   1. Essential hypertension     2. Prediabetes  POCT glycosylated hemoglobin (Hb A1C)   3. Skin lesion  SAL - Keri Pereira MD, Dermatology, [facility]:       Return if symptoms worsen or fail to improve, for bp check. Orders Placed This Encounter   Procedures   Hoang Francisco MD, Dermatology, [place]     Referral Priority:   Routine     Referral Type:   Eval and Treat     Referral Reason:   Specialty Services Required     Referred to Provider:   Mariana Payne MD     Requested Specialty:   Dermatology     Number of Visits Requested:   1    POCT glycosylated hemoglobin (Hb A1C)     No orders of the defined types were placed in this encounter. Add dyazide  Patient has a script from one year ago at home , see cardio office note from 6/2019. Was previously to use for edema but pt never used so has 30 day supply. Will trial this   Recheck cmp /bmp in 1-2 months to check lytes and cr   Keep atpp with cardio to further discuss, would recommend at least up dating echo .    Not on max doses of 3 antihypertensives so will await secondary HTN w/u at this time   Seek immediate medical attention for any chest pain , severe trouble breathing and/or visual changes. Monitor bps /continue to keep log    a1c 6 percent in office today   Continue to work on diet, add exercises   Reviewed changes to make call with q/c      Patientgiven educational materials - see patient instructions. Discussed use, benefit,and side effects of prescribed medications. All patient questions answered. Ptvoiced understanding. Reviewed health maintenance. Instructed to continue currentmedications, diet and exercise. Patient agreed with treatment plan. Follow up asdirected.      Electronically signed by Leda Pepe DO on 4/1/2021 at 11:18 AM

## 2021-07-13 RX ORDER — LISINOPRIL 30 MG/1
TABLET ORAL
Qty: 60 TABLET | Refills: 5 | Status: SHIPPED | OUTPATIENT
Start: 2021-07-13